# Patient Record
Sex: FEMALE | Race: BLACK OR AFRICAN AMERICAN | NOT HISPANIC OR LATINO | Employment: UNEMPLOYED | URBAN - METROPOLITAN AREA
[De-identification: names, ages, dates, MRNs, and addresses within clinical notes are randomized per-mention and may not be internally consistent; named-entity substitution may affect disease eponyms.]

---

## 2017-02-25 ENCOUNTER — HOSPITAL ENCOUNTER (EMERGENCY)
Facility: CLINIC | Age: 52
Discharge: HOME | End: 2017-02-25
Attending: EMERGENCY MEDICINE | Admitting: EMERGENCY MEDICINE
Payer: COMMERCIAL

## 2017-02-25 ENCOUNTER — APPOINTMENT (OUTPATIENT)
Dept: GENERAL RADIOLOGY | Facility: CLINIC | Age: 52
End: 2017-02-25
Attending: EMERGENCY MEDICINE
Payer: COMMERCIAL

## 2017-02-25 VITALS
HEART RATE: 94 BPM | DIASTOLIC BLOOD PRESSURE: 85 MMHG | WEIGHT: 150.7 LBS | SYSTOLIC BLOOD PRESSURE: 139 MMHG | BODY MASS INDEX: 26.7 KG/M2 | TEMPERATURE: 98 F | OXYGEN SATURATION: 95 % | RESPIRATION RATE: 12 BRPM

## 2017-02-25 DIAGNOSIS — J10.1 INFLUENZA B: ICD-10-CM

## 2017-02-25 LAB
ALBUMIN SERPL-MCNC: 3.5 G/DL (ref 3.4–5)
ALP SERPL-CCNC: 121 U/L (ref 40–150)
ALT SERPL W P-5'-P-CCNC: 37 U/L (ref 0–50)
ANION GAP SERPL CALCULATED.3IONS-SCNC: 9 MMOL/L (ref 3–14)
AST SERPL W P-5'-P-CCNC: 50 U/L (ref 0–45)
BASOPHILS # BLD AUTO: 0 10E9/L (ref 0–0.2)
BASOPHILS NFR BLD AUTO: 0.5 %
BILIRUB SERPL-MCNC: 0.2 MG/DL (ref 0.2–1.3)
BUN SERPL-MCNC: 7 MG/DL (ref 7–30)
CALCIUM SERPL-MCNC: 8.2 MG/DL (ref 8.5–10.1)
CHLORIDE SERPL-SCNC: 110 MMOL/L (ref 94–109)
CO2 SERPL-SCNC: 24 MMOL/L (ref 20–32)
CREAT SERPL-MCNC: 0.6 MG/DL (ref 0.52–1.04)
DIFFERENTIAL METHOD BLD: ABNORMAL
EOSINOPHIL # BLD AUTO: 0.2 10E9/L (ref 0–0.7)
EOSINOPHIL NFR BLD AUTO: 4.1 %
ERYTHROCYTE [DISTWIDTH] IN BLOOD BY AUTOMATED COUNT: 13.1 % (ref 10–15)
FLUAV+FLUBV AG SPEC QL: ABNORMAL
FLUAV+FLUBV AG SPEC QL: NEGATIVE
GFR SERPL CREATININE-BSD FRML MDRD: ABNORMAL ML/MIN/1.7M2
GLUCOSE SERPL-MCNC: 114 MG/DL (ref 70–99)
HCT VFR BLD AUTO: 37.7 % (ref 35–47)
HGB BLD-MCNC: 12.2 G/DL (ref 11.7–15.7)
IMM GRANULOCYTES # BLD: 0 10E9/L (ref 0–0.4)
IMM GRANULOCYTES NFR BLD: 0.3 %
LYMPHOCYTES # BLD AUTO: 1 10E9/L (ref 0.8–5.3)
LYMPHOCYTES NFR BLD AUTO: 25.2 %
MCH RBC QN AUTO: 30.3 PG (ref 26.5–33)
MCHC RBC AUTO-ENTMCNC: 32.4 G/DL (ref 31.5–36.5)
MCV RBC AUTO: 94 FL (ref 78–100)
MONOCYTES # BLD AUTO: 0.4 10E9/L (ref 0–1.3)
MONOCYTES NFR BLD AUTO: 9.7 %
NEUTROPHILS # BLD AUTO: 2.4 10E9/L (ref 1.6–8.3)
NEUTROPHILS NFR BLD AUTO: 60.2 %
NRBC # BLD AUTO: 0 10*3/UL
NRBC BLD AUTO-RTO: 0 /100
NT-PROBNP SERPL-MCNC: 55 PG/ML (ref 0–900)
PLATELET # BLD AUTO: 289 10E9/L (ref 150–450)
POTASSIUM SERPL-SCNC: 3.7 MMOL/L (ref 3.4–5.3)
PROT SERPL-MCNC: 7.1 G/DL (ref 6.8–8.8)
RBC # BLD AUTO: 4.02 10E12/L (ref 3.8–5.2)
SODIUM SERPL-SCNC: 143 MMOL/L (ref 133–144)
SPECIMEN SOURCE: ABNORMAL
TROPONIN I SERPL-MCNC: NORMAL UG/L (ref 0–0.04)
WBC # BLD AUTO: 3.9 10E9/L (ref 4–11)

## 2017-02-25 PROCEDURE — 99284 EMERGENCY DEPT VISIT MOD MDM: CPT | Mod: Z6 | Performed by: EMERGENCY MEDICINE

## 2017-02-25 PROCEDURE — 85025 COMPLETE CBC W/AUTO DIFF WBC: CPT | Performed by: EMERGENCY MEDICINE

## 2017-02-25 PROCEDURE — 99284 EMERGENCY DEPT VISIT MOD MDM: CPT | Mod: 25 | Performed by: EMERGENCY MEDICINE

## 2017-02-25 PROCEDURE — 87804 INFLUENZA ASSAY W/OPTIC: CPT | Performed by: EMERGENCY MEDICINE

## 2017-02-25 PROCEDURE — 71020 XR CHEST 2 VW: CPT

## 2017-02-25 PROCEDURE — 80053 COMPREHEN METABOLIC PANEL: CPT | Performed by: EMERGENCY MEDICINE

## 2017-02-25 PROCEDURE — 94640 AIRWAY INHALATION TREATMENT: CPT | Performed by: EMERGENCY MEDICINE

## 2017-02-25 PROCEDURE — 84484 ASSAY OF TROPONIN QUANT: CPT | Performed by: EMERGENCY MEDICINE

## 2017-02-25 PROCEDURE — 25000132 ZZH RX MED GY IP 250 OP 250 PS 637: Performed by: EMERGENCY MEDICINE

## 2017-02-25 PROCEDURE — 83880 ASSAY OF NATRIURETIC PEPTIDE: CPT | Performed by: EMERGENCY MEDICINE

## 2017-02-25 PROCEDURE — 25000125 ZZHC RX 250: Performed by: EMERGENCY MEDICINE

## 2017-02-25 RX ORDER — CODEINE PHOSPHATE AND GUAIFENESIN 10; 100 MG/5ML; MG/5ML
1-2 SOLUTION ORAL EVERY 4 HOURS PRN
Qty: 200 ML | Refills: 0 | Status: SHIPPED | OUTPATIENT
Start: 2017-02-25 | End: 2022-01-07

## 2017-02-25 RX ORDER — CODEINE PHOSPHATE AND GUAIFENESIN 10; 100 MG/5ML; MG/5ML
10 SOLUTION ORAL ONCE
Status: COMPLETED | OUTPATIENT
Start: 2017-02-25 | End: 2017-02-25

## 2017-02-25 RX ORDER — ALBUTEROL SULFATE 0.83 MG/ML
2.5 SOLUTION RESPIRATORY (INHALATION) ONCE
Status: COMPLETED | OUTPATIENT
Start: 2017-02-25 | End: 2017-02-25

## 2017-02-25 RX ADMIN — ALBUTEROL SULFATE 2.5 MG: 2.5 SOLUTION RESPIRATORY (INHALATION) at 17:37

## 2017-02-25 RX ADMIN — GUAIFENESIN AND CODEINE PHOSPHATE 10 ML: 10; 100 LIQUID ORAL at 18:25

## 2017-02-25 ASSESSMENT — ENCOUNTER SYMPTOMS
MYALGIAS: 1
COUGH: 1
ABDOMINAL PAIN: 0

## 2017-02-25 NOTE — ED PROVIDER NOTES
History     Chief Complaint   Patient presents with     Cough     cough for the past 3 days     HPI  Jenny Stephen is a 52 year old female who presents with cough for 3 days.  She denies asthma.  She says she is having trouble sleeping because of the cough.  Both sides of her chest and throat are hurting because she is coughing so much.  She denies hx of diabetes or heart disease.  She has been taking ibuprofen for discomfort.     I have reviewed the Medications, Allergies, Past Medical and Surgical History, and Social History in the Epic system.    Review of Systems   Respiratory: Positive for cough.    Cardiovascular: Positive for chest pain. Negative for leg swelling.   Gastrointestinal: Negative for abdominal pain.   Musculoskeletal: Positive for myalgias.   All other systems reviewed and are negative.      Physical Exam   BP: 145/75  Pulse: 98  Temp: 98  F (36.7  C)  Resp: 18  Weight: 68.4 kg (150 lb 11.2 oz)  SpO2: 97 %  Physical Exam   Constitutional: She is oriented to person, place, and time. She appears well-developed and well-nourished. No distress.   Coughing frequently but not sob.    HENT:   Head: Normocephalic and atraumatic.   Right Ear: External ear normal.   Left Ear: External ear normal.   Nose: Nose normal.   Mouth/Throat: Oropharynx is clear and moist.   Eyes: EOM are normal. Pupils are equal, round, and reactive to light.   Neck: Normal range of motion. Neck supple.   Cardiovascular: Normal rate, regular rhythm and normal heart sounds.    Pulmonary/Chest: Effort normal and breath sounds normal.   Coughing frequently   Abdominal: Soft. Bowel sounds are normal.   Musculoskeletal: Normal range of motion. She exhibits no edema.   Lymphadenopathy:     She has no cervical adenopathy.   Neurological: She is alert and oriented to person, place, and time.   Skin: Skin is warm and dry. No rash noted. She is not diaphoretic.   Psychiatric: She has a normal mood and affect. Her behavior is  normal.   Nursing note and vitals reviewed.      ED Course     ED Course     Procedures           Results for orders placed or performed during the hospital encounter of 02/25/17 (from the past 24 hour(s))   Influenza A/B antigen swab   Result Value Ref Range    Influenza A/B Agn Specimen Nasopharyngeal     Influenza A Negative NEG    Influenza B (A) NEG     Positive   Test results must be correlated with clinical data. If necessary, results   should be confirmed by a molecular assay or viral culture.     CBC with platelets differential   Result Value Ref Range    WBC 3.9 (L) 4.0 - 11.0 10e9/L    RBC Count 4.02 3.8 - 5.2 10e12/L    Hemoglobin 12.2 11.7 - 15.7 g/dL    Hematocrit 37.7 35.0 - 47.0 %    MCV 94 78 - 100 fl    MCH 30.3 26.5 - 33.0 pg    MCHC 32.4 31.5 - 36.5 g/dL    RDW 13.1 10.0 - 15.0 %    Platelet Count 289 150 - 450 10e9/L    Diff Method Automated Method     % Neutrophils 60.2 %    % Lymphocytes 25.2 %    % Monocytes 9.7 %    % Eosinophils 4.1 %    % Basophils 0.5 %    % Immature Granulocytes 0.3 %    Nucleated RBCs 0 0 /100    Absolute Neutrophil 2.4 1.6 - 8.3 10e9/L    Absolute Lymphocytes 1.0 0.8 - 5.3 10e9/L    Absolute Monocytes 0.4 0.0 - 1.3 10e9/L    Absolute Eosinophils 0.2 0.0 - 0.7 10e9/L    Absolute Basophils 0.0 0.0 - 0.2 10e9/L    Abs Immature Granulocytes 0.0 0 - 0.4 10e9/L    Absolute Nucleated RBC 0.0    Comprehensive metabolic panel   Result Value Ref Range    Sodium 143 133 - 144 mmol/L    Potassium 3.7 3.4 - 5.3 mmol/L    Chloride 110 (H) 94 - 109 mmol/L    Carbon Dioxide 24 20 - 32 mmol/L    Anion Gap 9 3 - 14 mmol/L    Glucose 114 (H) 70 - 99 mg/dL    Urea Nitrogen 7 7 - 30 mg/dL    Creatinine 0.60 0.52 - 1.04 mg/dL    GFR Estimate >90  Non  GFR Calc   >60 mL/min/1.7m2    GFR Estimate If Black >90   GFR Calc   >60 mL/min/1.7m2    Calcium 8.2 (L) 8.5 - 10.1 mg/dL    Bilirubin Total 0.2 0.2 - 1.3 mg/dL    Albumin 3.5 3.4 - 5.0 g/dL    Protein Total  7.1 6.8 - 8.8 g/dL    Alkaline Phosphatase 121 40 - 150 U/L    ALT 37 0 - 50 U/L    AST 50 (H) 0 - 45 U/L   Troponin I   Result Value Ref Range    Troponin I ES  0.000 - 0.045 ug/L     <0.015  The 99th percentile for upper reference range is 0.045 ug/L.  Troponin values in   the range of 0.045 - 0.120 ug/L may be associated with risks of adverse   clinical events.     Nt probnp inpatient (BNP)   Result Value Ref Range    N-Terminal Pro BNP Inpatient 55 0 - 900 pg/mL   XR Chest 2 Views    Narrative    XR CHEST 2 VW 2/25/2017 6:48 PM    COMPARISON: 7/10/2012    HISTORY: Cough      Impression    IMPRESSION: Cardiac silhouette and pulmonary vasculature are within  normal limits. No focal airspace disease, pleural effusion or  pneumothorax.    SAHIL BRONSON           Assessments & Plan (with Medical Decision Making)   The patient presents with coughing a lot for the past 3 days.  She denies hx of diabetes or heart disease.  Labs and cxr were ordered and reviewed.  The influenza B test is positive.  BNP and troponin are normal.  CXR is negative for infiltrate or pneumothorax.  Cardiac silhouette is not enlarged.  She was given an albuterol neb and lung sounds do not change.  She was given robitussin AC and she feels better.  She should continue with ibuprofen.  I will prescribe robitussin AC.  She has been sick for 3 days so I will not prescribe tamiflu.  She does not appear sob, is not hypoxic or febrile here.  She was d/c home.   I have reviewed the nursing notes.    I have reviewed the findings, diagnosis, plan and need for follow up with the patient.    New Prescriptions    GUAIFENESIN-CODEINE (ROBITUSSIN AC) 100-10 MG/5ML SOLN SOLUTION    Take 5-10 mLs by mouth every 4 hours as needed for cough       Final diagnoses:   Influenza B       2/25/2017   [facility], [place], EMERGENCY DEPARTMENT     Odilia Lawrence MD  02/25/17 8618

## 2017-02-25 NOTE — ED AVS SNAPSHOT
[facility], Emergency Department    [address]    [facility] [address]    Phone:  [phone]    Fax:  [phone]                                       Jenny Stephen   MRN: [id number]    Department:  [facility], Emergency Department   Date of Visit:  2/25/2017           Patient Information     Date Of Birth          1965        Your diagnoses for this visit were:     Influenza B        You were seen by Odilia Lawrence MD.        Discharge Instructions       Continue to use ibuprofen for body aches and fever.     You can use robitussin AC for cough.  It will also help with pain and will help you sleep at night.         Influenza  Influenza ( the flu ) is an infection that affects your respiratory tract (the mouth, nose, and lungs, and the passages between them). Unlike a cold, the flu can make you very ill. And it can lead to pneumonia, a serious lung infection. For some people, especially older adults, young children, and people with certain chronic conditions, the flu can have serious complications and even be fatal.  What Are the Risk Factors for the Flu?     Viruses that cause influenza spread through the air in droplets when someone who has the flu coughs, sneezes, laughs, or talks.   Anyone can get the flu. But you re more likely to become infected if you:    Have a weakened immune system.    Work in a health care setting where you may be exposed to flu germs.    Live or work with someone who has the flu.    Haven t received an annual flu shot.  How Does the Flu Spread?  The flu is caused by viruses. The viruses spread through the air in droplets when someone who has the flu coughs, sneezes, laughs, or talks. You can become infected when you inhale these viruses directly. You can also become infected when you touch a surface on which the droplets have landed and then transfer the germs to your eyes, nose, or mouth. Touching used tissues, or sharing utensils, drinking glasses, or a  toothbrush with an infected person can expose you to flu viruses, too.  What Are the Symptoms of the Flu?  Flu symptoms tend to come on quickly and may last a few days to a few weeks. They include:    Fever usually higher than 101 F  (38.3 C) and chills    Sore throat and headache    Dry cough    Runny nose    Tiredness and weakness    Muscle aches  Factors That Can Make Flu Worse  For some people, the flu can be very serious. The risk of complications is greater for:    Children under age 5.    Adults 65 years of age and older.    People with a chronic illness, such as diabetes or heart, kidney, or lung disease.    People who live in a nursing home or long-term care facility.   How Is the Flu Treated?  Influenza usually improves after 7 days or so. In some cases, your health care provider may prescribe an antiviral medication. This may help you get well sooner. For the medication to help, you need to take it as soon as possible (ideally within 48 hours) after your symptoms start. If you develop pneumonia or other serious illness, hospital care may be needed.  Easing Flu Symptoms    Drink lots of fluids such as water, juice, and warm soup. A good rule is to drink enough so that you urinate your normal amount.    Get plenty of rest.    Ask your health care provider what to take for fever and pain.    Call your provider if your fever rises over 101 F (38.3 C) or you become dizzy, lightheaded, or short of breath.  Taking Steps to Protect Others    Wash your hands often, especially after coughing or sneezing. Or, clean your hands with an alcohol-based hand  containing at least 60 percent alcohol.    Cough or sneeze into a tissue. Then throw the tissue away and wash your hands. If you don t have a tissue, cough and sneeze into the crook of your elbow.    Stay home until at least 24 hours after you no longer have a fever or chills. Be sure the fever isn t being hidden by fever-reducing medication.    Don t share  food, utensils, drinking glasses, or a toothbrush with others.    Ask your health care provider if others in your household should receive antiviral medication to help them avoid infection.  How Can the Flu Be Prevented?    One of the best ways to avoid the flu is to get a flu vaccination each year. Viruses that cause the flu change from year to year. For that reason, doctors recommend getting the flu vaccine each year, as soon as it's available in your area. The vaccine may be given as a shot or as a nasal spray. Your health care provider can tell you which vaccine is right for you.    Wash your hands often. Frequent handwashing is a proven way to help prevent infection.    Carry an alcohol-based hand gel containing at least 60 percent alcohol. Use it when you don t have access to soap and water. Then wash your hands as soon as you can.    Avoid touching your eyes, nose, and mouth.    At home and work, clean phones, computer keyboards, and toys often with disinfectant wipes.    If possible, avoid close contact with others who have the flu or symptoms of the flu.  Handwashing Tips  Handwashing is one of the best ways to prevent many common infections. If you re caring for or visiting someone with the flu, wash your hands each time you enter and leave the room. Follow these steps:    Use warm water and plenty of soap. Rub your hands together well.    Clean the whole hand, under your nails, between your fingers, and up the wrists.    Wash for at least 15 seconds.    Rinse, letting the water run down your fingers, not up your wrists.    Dry your hands well. Use a paper towel to turn off the faucet and open the door.  Using Alcohol-Based Hand   Alcohol-based hand  are also a good choice. Use them when you don t have access to soap and water. Follow these steps:    Squeeze about a tablespoon of gel into the palm of one hand.    Rub your hands together briskly, cleaning the backs of your hands, the palms,  between your fingers, and up the wrists.    Rub until the gel is gone and your hands are completely dry.  Preventing Influenza in Healthcare Settings  The flu is a special concern for people in hospitals and long-term care facilities. To help prevent the spread of flu, many hospitals and nursing homes take these steps:    Health care providers wash their hands or use an alcohol-based hand  before and after treating each patient.    People with the flu have private rooms and bathrooms or share a room with someone with the same infection.    High-risk patients who don t have the flu are encouraged to get the flu and pneumonia vaccines.    All health care workers are encouraged or required to get flu shots.        5886-4432 [employer]. [address] [place] [address], [place] [address]. All rights reserved. This information is not intended as a substitute for professional medical care. Always follow your healthcare professional's instructions.          24 Hour Appointment Hotline       To make an appointment at any [facility], call [phone] ([phone]). If you don't have a family doctor or clinic, we will help you find one. [facility] clinics are conveniently located to serve the needs of you and your family.             Review of your medicines      START taking        Dose / Directions Last dose taken    guaiFENesin-codeine 100-10 MG/5ML Soln solution   Commonly known as:  ROBITUSSIN AC   Dose:  1-2 tsp.   Quantity:  200 mL        Take 5-10 mLs by mouth every 4 hours as needed for cough   Refills:  0          Our records show that you are taking the medicines listed below. If these are incorrect, please call your family doctor or clinic.        Dose / Directions Last dose taken    APAP 500 MG Tabs   Dose:  1-2 tablet        Take 1-2 tablets by mouth 2 times daily.   Refills:  0        CYMBALTA 30 MG EC capsule   Dose:  1 capsule   Generic drug:  DULoxetine        Take 1 capsule by mouth  "daily.   Refills:  0        HYDROcodone-acetaminophen 5-325 MG per tablet   Commonly known as:  NORCO   Dose:  1-2 tablet   Quantity:  15 tablet        Take 1-2 tablets by mouth every 4 hours as needed for moderate to severe pain   Refills:  0        ibuprofen 600 MG tablet   Commonly known as:  ADVIL/MOTRIN   Dose:  600 mg   Quantity:  30 tablet        Take 1 tablet (600 mg) by mouth every 6 hours as needed for pain   Refills:  0        nystatin 389074 UNIT/GM Powd   Commonly known as:  MYCOSTATIN   Quantity:  30 g        Apply topically 3 times daily as needed   Refills:  1                Prescriptions were sent or printed at these locations (1 Prescription)                   Other Prescriptions                Printed at Department/Unit printer (1 of 1)         guaiFENesin-codeine (ROBITUSSIN AC) 100-10 MG/5ML SOLN solution                Procedures and tests performed during your visit     CBC with platelets differential    Comprehensive metabolic panel    Influenza A/B antigen swab    Nt probnp inpatient (BNP)    Troponin I    XR Chest 2 Views      Orders Needing Specimen Collection     None      Pending Results     No orders found from 2/23/2017 to 2/26/2017.            Pending Culture Results     No orders found from 2/23/2017 to 2/26/2017.            Thank you for choosing [facility]       Thank you for choosing [facility] for your care. Our goal is always to provide you with excellent care. Hearing back from our patients is one way we can continue to improve our services. Please take a few minutes to complete the written survey that you may receive in the mail after you visit with us. Thank you!        [employer] Information     [employer] lets you send messages to your doctor, view your test results, renew your prescriptions, schedule appointments and more. To sign up, go to [email or web address] . Click on \"Log in\" on the left side of the screen, which will take you to the Welcome page. Then click on \"Sign up " "Now\" on the right side of the page.     You will be asked to enter the access code listed below, as well as some personal information. Please follow the directions to create your username and password.     Your access code is: [email or web address]  Expires: 2017  7:14 PM     Your access code will  in 90 days. If you need help or a new code, please call your [facility] clinic or [phone].        Care EveryWhere ID     This is your Care EveryWhere ID. This could be used by other organizations to access your [facility] medical records  [id number]        After Visit Summary       This is your record. Keep this with you and show to your community pharmacist(s) and doctor(s) at your next visit.                  "

## 2017-02-25 NOTE — ED AVS SNAPSHOT
[facility], Emergency Department    [address]    [facility] [place] [address]    Phone:  [phone]    Fax:  [phone]                                       Jenny Stephen   MRN: [id number]    Department:  [facility], Emergency Department   Date of Visit:  2/25/2017           After Visit Summary Signature Page     I have received my discharge instructions, and my questions have been answered. I have discussed any challenges I see with this plan with the nurse or doctor.    ..........................................................................................................................................  Patient/Patient Representative Signature      ..........................................................................................................................................  Patient Representative Print Name and Relationship to Patient    ..................................................               ................................................  Date                                            Time    ..........................................................................................................................................  Reviewed by Signature/Title    ...................................................              ..............................................  Date                                                            Time

## 2017-02-26 NOTE — DISCHARGE INSTRUCTIONS
Continue to use ibuprofen for body aches and fever.     You can use robitussin AC for cough.  It will also help with pain and will help you sleep at night.         Influenza  Influenza ( the flu ) is an infection that affects your respiratory tract (the mouth, nose, and lungs, and the passages between them). Unlike a cold, the flu can make you very ill. And it can lead to pneumonia, a serious lung infection. For some people, especially older adults, young children, and people with certain chronic conditions, the flu can have serious complications and even be fatal.  What Are the Risk Factors for the Flu?     Viruses that cause influenza spread through the air in droplets when someone who has the flu coughs, sneezes, laughs, or talks.   Anyone can get the flu. But you re more likely to become infected if you:    Have a weakened immune system.    Work in a health care setting where you may be exposed to flu germs.    Live or work with someone who has the flu.    Haven t received an annual flu shot.  How Does the Flu Spread?  The flu is caused by viruses. The viruses spread through the air in droplets when someone who has the flu coughs, sneezes, laughs, or talks. You can become infected when you inhale these viruses directly. You can also become infected when you touch a surface on which the droplets have landed and then transfer the germs to your eyes, nose, or mouth. Touching used tissues, or sharing utensils, drinking glasses, or a toothbrush with an infected person can expose you to flu viruses, too.  What Are the Symptoms of the Flu?  Flu symptoms tend to come on quickly and may last a few days to a few weeks. They include:    Fever usually higher than 101 F  (38.3 C) and chills    Sore throat and headache    Dry cough    Runny nose    Tiredness and weakness    Muscle aches  Factors That Can Make Flu Worse  For some people, the flu can be very serious. The risk of complications is greater for:    Children under  age 5.    Adults 65 years of age and older.    People with a chronic illness, such as diabetes or heart, kidney, or lung disease.    People who live in a nursing home or long-term care facility.   How Is the Flu Treated?  Influenza usually improves after 7 days or so. In some cases, your health care provider may prescribe an antiviral medication. This may help you get well sooner. For the medication to help, you need to take it as soon as possible (ideally within 48 hours) after your symptoms start. If you develop pneumonia or other serious illness, hospital care may be needed.  Easing Flu Symptoms    Drink lots of fluids such as water, juice, and warm soup. A good rule is to drink enough so that you urinate your normal amount.    Get plenty of rest.    Ask your health care provider what to take for fever and pain.    Call your provider if your fever rises over 101 F (38.3 C) or you become dizzy, lightheaded, or short of breath.  Taking Steps to Protect Others    Wash your hands often, especially after coughing or sneezing. Or, clean your hands with an alcohol-based hand  containing at least 60 percent alcohol.    Cough or sneeze into a tissue. Then throw the tissue away and wash your hands. If you don t have a tissue, cough and sneeze into the crook of your elbow.    Stay home until at least 24 hours after you no longer have a fever or chills. Be sure the fever isn t being hidden by fever-reducing medication.    Don t share food, utensils, drinking glasses, or a toothbrush with others.    Ask your health care provider if others in your household should receive antiviral medication to help them avoid infection.  How Can the Flu Be Prevented?    One of the best ways to avoid the flu is to get a flu vaccination each year. Viruses that cause the flu change from year to year. For that reason, doctors recommend getting the flu vaccine each year, as soon as it's available in your area. The vaccine may be given as a  shot or as a nasal spray. Your health care provider can tell you which vaccine is right for you.    Wash your hands often. Frequent handwashing is a proven way to help prevent infection.    Carry an alcohol-based hand gel containing at least 60 percent alcohol. Use it when you don t have access to soap and water. Then wash your hands as soon as you can.    Avoid touching your eyes, nose, and mouth.    At home and work, clean phones, computer keyboards, and toys often with disinfectant wipes.    If possible, avoid close contact with others who have the flu or symptoms of the flu.  Handwashing Tips  Handwashing is one of the best ways to prevent many common infections. If you re caring for or visiting someone with the flu, wash your hands each time you enter and leave the room. Follow these steps:    Use warm water and plenty of soap. Rub your hands together well.    Clean the whole hand, under your nails, between your fingers, and up the wrists.    Wash for at least 15 seconds.    Rinse, letting the water run down your fingers, not up your wrists.    Dry your hands well. Use a paper towel to turn off the faucet and open the door.  Using Alcohol-Based Hand   Alcohol-based hand  are also a good choice. Use them when you don t have access to soap and water. Follow these steps:    Squeeze about a tablespoon of gel into the palm of one hand.    Rub your hands together briskly, cleaning the backs of your hands, the palms, between your fingers, and up the wrists.    Rub until the gel is gone and your hands are completely dry.  Preventing Influenza in Healthcare Settings  The flu is a special concern for people in hospitals and long-term care facilities. To help prevent the spread of flu, many hospitals and nursing homes take these steps:    Health care providers wash their hands or use an alcohol-based hand  before and after treating each patient.    People with the flu have private rooms and  bathrooms or share a room with someone with the same infection.    High-risk patients who don t have the flu are encouraged to get the flu and pneumonia vaccines.    All health care workers are encouraged or required to get flu shots.        4331-4092 [employer]. [address] [place] [address]. All rights reserved. This information is not intended as a substitute for professional medical care. Always follow your healthcare professional's instructions.

## 2017-10-25 ENCOUNTER — HOSPITAL ENCOUNTER (EMERGENCY)
Facility: CLINIC | Age: 52
Discharge: HOME | End: 2017-10-25
Attending: EMERGENCY MEDICINE | Admitting: EMERGENCY MEDICINE
Payer: COMMERCIAL

## 2017-10-25 ENCOUNTER — APPOINTMENT (OUTPATIENT)
Dept: GENERAL RADIOLOGY | Facility: CLINIC | Age: 52
End: 2017-10-25
Attending: EMERGENCY MEDICINE
Payer: COMMERCIAL

## 2017-10-25 VITALS
DIASTOLIC BLOOD PRESSURE: 81 MMHG | OXYGEN SATURATION: 100 % | TEMPERATURE: 97.9 F | HEART RATE: 72 BPM | WEIGHT: 152.6 LBS | RESPIRATION RATE: 17 BRPM | HEIGHT: 63 IN | BODY MASS INDEX: 27.04 KG/M2 | SYSTOLIC BLOOD PRESSURE: 148 MMHG

## 2017-10-25 DIAGNOSIS — J18.9 COMMUNITY ACQUIRED PNEUMONIA, UNSPECIFIED LATERALITY: ICD-10-CM

## 2017-10-25 LAB
ANION GAP SERPL CALCULATED.3IONS-SCNC: 6 MMOL/L (ref 3–14)
BASOPHILS # BLD AUTO: 0 10E9/L (ref 0–0.2)
BASOPHILS NFR BLD AUTO: 0.4 %
BUN SERPL-MCNC: 9 MG/DL (ref 7–30)
CALCIUM SERPL-MCNC: 8.9 MG/DL (ref 8.5–10.1)
CHLORIDE SERPL-SCNC: 107 MMOL/L (ref 94–109)
CO2 SERPL-SCNC: 29 MMOL/L (ref 20–32)
CREAT SERPL-MCNC: 0.66 MG/DL (ref 0.52–1.04)
DIFFERENTIAL METHOD BLD: ABNORMAL
EOSINOPHIL # BLD AUTO: 0.2 10E9/L (ref 0–0.7)
EOSINOPHIL NFR BLD AUTO: 4.7 %
ERYTHROCYTE [DISTWIDTH] IN BLOOD BY AUTOMATED COUNT: 13 % (ref 10–15)
GFR SERPL CREATININE-BSD FRML MDRD: >90 ML/MIN/1.7M2
GLUCOSE SERPL-MCNC: 102 MG/DL (ref 70–99)
HCT VFR BLD AUTO: 39.6 % (ref 35–47)
HGB BLD-MCNC: 12.7 G/DL (ref 11.7–15.7)
IMM GRANULOCYTES # BLD: 0 10E9/L (ref 0–0.4)
IMM GRANULOCYTES NFR BLD: 0.2 %
LYMPHOCYTES # BLD AUTO: 2.4 10E9/L (ref 0.8–5.3)
LYMPHOCYTES NFR BLD AUTO: 53.9 %
MCH RBC QN AUTO: 30 PG (ref 26.5–33)
MCHC RBC AUTO-ENTMCNC: 32.1 G/DL (ref 31.5–36.5)
MCV RBC AUTO: 94 FL (ref 78–100)
MONOCYTES # BLD AUTO: 0.4 10E9/L (ref 0–1.3)
MONOCYTES NFR BLD AUTO: 9.6 %
NEUTROPHILS # BLD AUTO: 1.4 10E9/L (ref 1.6–8.3)
NEUTROPHILS NFR BLD AUTO: 31.2 %
NRBC # BLD AUTO: 0 10*3/UL
NRBC BLD AUTO-RTO: 0 /100
PLATELET # BLD AUTO: 311 10E9/L (ref 150–450)
POTASSIUM SERPL-SCNC: 4 MMOL/L (ref 3.4–5.3)
RBC # BLD AUTO: 4.23 10E12/L (ref 3.8–5.2)
SODIUM SERPL-SCNC: 142 MMOL/L (ref 133–144)
TROPONIN I SERPL-MCNC: <0.015 UG/L (ref 0–0.04)
WBC # BLD AUTO: 4.5 10E9/L (ref 4–11)

## 2017-10-25 PROCEDURE — 93010 ELECTROCARDIOGRAM REPORT: CPT | Mod: Z6 | Performed by: EMERGENCY MEDICINE

## 2017-10-25 PROCEDURE — 99285 EMERGENCY DEPT VISIT HI MDM: CPT | Mod: 25 | Performed by: EMERGENCY MEDICINE

## 2017-10-25 PROCEDURE — 84484 ASSAY OF TROPONIN QUANT: CPT | Performed by: EMERGENCY MEDICINE

## 2017-10-25 PROCEDURE — 99284 EMERGENCY DEPT VISIT MOD MDM: CPT | Mod: 25 | Performed by: EMERGENCY MEDICINE

## 2017-10-25 PROCEDURE — 93005 ELECTROCARDIOGRAM TRACING: CPT | Performed by: EMERGENCY MEDICINE

## 2017-10-25 PROCEDURE — 85025 COMPLETE CBC W/AUTO DIFF WBC: CPT | Performed by: EMERGENCY MEDICINE

## 2017-10-25 PROCEDURE — 96360 HYDRATION IV INFUSION INIT: CPT | Performed by: EMERGENCY MEDICINE

## 2017-10-25 PROCEDURE — 80048 BASIC METABOLIC PNL TOTAL CA: CPT | Performed by: EMERGENCY MEDICINE

## 2017-10-25 PROCEDURE — 71020 XR CHEST 2 VW: CPT

## 2017-10-25 PROCEDURE — 25000128 H RX IP 250 OP 636: Performed by: EMERGENCY MEDICINE

## 2017-10-25 RX ORDER — AZITHROMYCIN 250 MG/1
TABLET, FILM COATED ORAL
Qty: 6 TABLET | Refills: 0 | Status: SHIPPED | OUTPATIENT
Start: 2017-10-25 | End: 2017-10-30

## 2017-10-25 RX ADMIN — SODIUM CHLORIDE 1000 ML: 9 INJECTION, SOLUTION INTRAVENOUS at 22:25

## 2017-10-25 ASSESSMENT — ENCOUNTER SYMPTOMS
FEVER: 0
COUGH: 1
SHORTNESS OF BREATH: 1

## 2017-10-25 NOTE — ED AVS SNAPSHOT
[facility], Emergency Department    [address]    [place] [address]    Phone:  [phone]    Fax:  [phone]                                       Jenny Stephen   MRN: [id number]    Department:  [facility], Emergency Department   Date of Visit:  10/25/2017           After Visit Summary Signature Page     I have received my discharge instructions, and my questions have been answered. I have discussed any challenges I see with this plan with the nurse or doctor.    ..........................................................................................................................................  Patient/Patient Representative Signature      ..........................................................................................................................................  Patient Representative Print Name and Relationship to Patient    ..................................................               ................................................  Date                                            Time    ..........................................................................................................................................  Reviewed by Signature/Title    ...................................................              ..............................................  Date                                                            Time

## 2017-10-25 NOTE — ED AVS SNAPSHOT
[facility], Emergency Department    [address]    [facility] [place] [address]    Phone:  [phone]    Fax:  [phone]                                       Jenny Stephen   MRN: [id number]    Department:  [facility], Emergency Department   Date of Visit:  10/25/2017           Patient Information     Date Of Birth          1965        Your diagnoses for this visit were:     Community acquired pneumonia, unspecified laterality        You were seen by Odilia Lawrence MD.        Discharge Instructions       Take zpak as prescribed.      Follow up with your primary care doctor in 1 week for recheck unless completely improved.     Stay well hydarted.         24 Hour Appointment Hotline       To make an appointment at any [facility] clinic, call [phone] ([phone]). If you don't have a family doctor or clinic, we will help you find one. [facility] clinics are conveniently located to serve the needs of you and your family.             Review of your medicines      START taking        Dose / Directions Last dose taken    azithromycin 250 MG tablet   Commonly known as:  ZITHROMAX Z-DACIA   Quantity:  6 tablet        Two tablets on the first day, then one tablet daily for the next 4 days   Refills:  0          Our records show that you are taking the medicines listed below. If these are incorrect, please call your family doctor or clinic.        Dose / Directions Last dose taken    APAP 500 MG Tabs   Dose:  1-2 tablet        Take 1-2 tablets by mouth 2 times daily.   Refills:  0        CYMBALTA 30 MG EC capsule   Dose:  1 capsule   Generic drug:  DULoxetine        Take 1 capsule by mouth daily.   Refills:  0        guaiFENesin-codeine 100-10 MG/5ML Soln solution   Commonly known as:  ROBITUSSIN AC   Dose:  1-2 tsp.   Quantity:  200 mL        Take 5-10 mLs by mouth every 4 hours as needed for cough   Refills:  0        HYDROcodone-acetaminophen 5-325 MG per tablet   Commonly known as:  NORCO   Dose:   "1-2 tablet   Quantity:  15 tablet        Take 1-2 tablets by mouth every 4 hours as needed for moderate to severe pain   Refills:  0        ibuprofen 600 MG tablet   Commonly known as:  ADVIL/MOTRIN   Dose:  600 mg   Quantity:  30 tablet        Take 1 tablet (600 mg) by mouth every 6 hours as needed for pain   Refills:  0                Prescriptions were sent or printed at these locations (1 Prescription)                   Other Prescriptions                Printed at Department/Unit printer (1 of 1)         azithromycin (ZITHROMAX Z-DACIA) 250 MG tablet                Procedures and tests performed during your visit     Basic metabolic panel    CBC with platelets differential    EKG 12 lead    Troponin I    XR Chest 2 Views      Orders Needing Specimen Collection     None      Pending Results     No orders found from 10/23/2017 to 10/26/2017.            Pending Culture Results     No orders found from 10/23/2017 to 10/26/2017.            Pending Results Instructions     If you had any lab results that were not finalized at the time of your Discharge, you can call the ED Lab Result RN at [phone]. You will be contacted by this team for any positive Lab results or changes in treatment. The nurses are available 7 days a week from 10A to 6:30P.  You can leave a message 24 hours per day and they will return your call.        Thank you for choosing [facility]       Thank you for choosing [facility] for your care. Our goal is always to provide you with excellent care. Hearing back from our patients is one way we can continue to improve our services. Please take a few minutes to complete the written survey that you may receive in the mail after you visit with us. Thank you!        [facility] Information     [employer] lets you send messages to your doctor, view your test results, renew your prescriptions, schedule appointments and more. To sign up, go to [email or web address] . Click on \"Log in\" on the left side of the screen, " "which will take you to the Welcome page. Then click on \"Sign up Now\" on the right side of the page.     You will be asked to enter the access code listed below, as well as some personal information. Please follow the directions to create your username and password.     Your access code is: [email or web address]  Expires: 2018 11:35 PM     Your access code will  in 90 days. If you need help or a new code, please call your [facility] clinic or [phone].        Care EveryWhere ID     This is your Care EveryWhere ID. This could be used by other organizations to access your [facility] medical records  [id number]        Equal Access to Services     THOMAS ADAMS : Roxane Worrell, erich auguste, merry funez, sydnee guerra. So [facility] [phone].    ATENCIÓN: Si habla español, tiene a oro disposición servicios gratuitos de asistencia lingüística. Llame al [phone].    We comply with applicable federal civil rights laws and [place] laws. We do not discriminate on the basis of race, color, national origin, age, disability, sex, sexual orientation, or gender identity.            After Visit Summary       This is your record. Keep this with you and show to your community pharmacist(s) and doctor(s) at your next visit.                  "

## 2017-10-26 LAB — INTERPRETATION ECG - MUSE: NORMAL

## 2017-10-26 NOTE — ED PROVIDER NOTES
[facility] EMERGENCY DEPARTMENT ([facility])    10/25/17       History     Chief Complaint   Patient presents with     Respiratory Distress     Patient states she has difficulity taking a deep breath in and when she does, she has chest pain that radiates around chest. Symptoms for the last 3 days.      The history is provided by the patient and a relative. The history is limited by a language barrier. A  was used (Wallisian).     Jenny Stephen is a 52 year old female with a medical history of lobar pneumonia, latent tuberculosis and seasonal allergies who presents to the Emergency Department for evaluation of chest pain and difficulty taking a deep breath due to pain. Patient reports the chest pain started 4 days ago, and has been intermittent since. She reports the chest pain is bilateral and moves around her chest. She notes difficulty taking a deep breath due to pain. Patient notes taking a hot shower and eating something hot improves the pain; however, going outside and taking a breath in the cold air exacerbates the pain. Patient notes having similar symptoms 3-4 years ago, in which she was admitted to the hospital. She reports at that time, she was diagnosed with pneumonia and was treated with medications. Patient today denies any fevers, but notes having a cough. She reports taking ibuprofen with minimal relief of the pain. She denies being pregnant and denies any history of PE or DVT.    I have reviewed the Medications, Allergies, Past Medical and Surgical History, and Social History in the [employer] system.    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 No pertinent surgical history.    No family history on file.    Social History   Substance Use Topics     Smoking  "status: Never Smoker     Smokeless tobacco: Never Used     Alcohol use No       No current facility-administered medications for this encounter.      Current Outpatient Prescriptions   Medication     azithromycin (ZITHROMAX Z-DACIA) 250 MG tablet     guaiFENesin-codeine (ROBITUSSIN AC) 100-10 MG/5ML SOLN solution     ibuprofen (ADVIL,MOTRIN) 600 MG tablet     HYDROcodone-acetaminophen (NORCO) 5-325 MG per tablet     Acetaminophen (APAP) 500 MG TABS     DULoxetine (CYMBALTA) 30 MG capsule        Allergies   Allergen Reactions     Chicken Allergy      Date Seed Extract [Zizyphus Jujuba] Swelling     Allergy to Date fruit. Swelling in eyes.      Eggs [Chicken-Derived Products (Egg)] Rash         Review of Systems   Constitutional: Negative for fever.   Respiratory: Positive for cough and shortness of breath (due to pain).    Cardiovascular: Positive for chest pain (intermittent).   All other systems reviewed and are negative.      Physical Exam   BP: 143/77  Pulse: 72  Heart Rate: 71  Temp: 97.8  F (36.6  C)  Resp: 16  Height: 160 cm (5' 3\")  Weight: 69.2 kg (152 lb 9.6 oz)  SpO2: 99 %      Physical Exam   Constitutional: She is oriented to person, place, and time. She appears well-developed and well-nourished. No distress.   HENT:   Head: Normocephalic and atraumatic.   Right Ear: External ear normal.   Left Ear: External ear normal.   Nose: Nose normal.   Mouth/Throat: Oropharynx is clear and moist.   Eyes: EOM are normal. Pupils are equal, round, and reactive to light.   Neck: Normal range of motion. Neck supple.   Cardiovascular: Normal rate, regular rhythm and normal heart sounds.    Pulmonary/Chest: Effort normal and breath sounds normal. She exhibits tenderness.   Dry cough   Abdominal: Soft. Bowel sounds are normal. She exhibits no distension and no mass. There is no tenderness. There is no rebound (over sternum but also back muscles bilaterally) and no guarding.   Musculoskeletal: Normal range of motion. She " exhibits no edema or tenderness.   Neurological: She is alert and oriented to person, place, and time.   Skin: Skin is warm and dry. She is not diaphoretic.   Psychiatric: She has a normal mood and affect.   Nursing note and vitals reviewed.      ED Course   8:16 PM  The patient was seen and examined by Odilia Lawrence MD in Room ED03.     ED Course     Procedures             EKG Interpretation:      Interpreted by Odilia Lawrence  Time reviewed: 2205  Symptoms at time of EKG: None   Rhythm: normal sinus   Rate: Normal  Axis: Normal  Ectopy: none  Conduction: normal  ST Segments/ T Waves: No acute ischemic changes  Q Waves: none  Comparison to prior: Unchanged from 10/21/11    Clinical Impression: no acute changes                   Labs Ordered and Resulted from Time of ED Arrival Up to the Time of Departure from the ED   CBC WITH PLATELETS DIFFERENTIAL - Abnormal; Notable for the following:        Result Value    Absolute Neutrophil 1.4 (*)     All other components within normal limits   BASIC METABOLIC PANEL - Abnormal; Notable for the following:     Glucose 102 (*)     All other components within normal limits   TROPONIN I        Results for orders placed or performed during the hospital encounter of 10/25/17 (from the past 24 hour(s))   CBC with platelets differential   Result Value Ref Range    WBC 4.5 4.0 - 11.0 10e9/L    RBC Count 4.23 3.8 - 5.2 10e12/L    Hemoglobin 12.7 11.7 - 15.7 g/dL    Hematocrit 39.6 35.0 - 47.0 %    MCV 94 78 - 100 fl    MCH 30.0 26.5 - 33.0 pg    MCHC 32.1 31.5 - 36.5 g/dL    RDW 13.0 10.0 - 15.0 %    Platelet Count 311 150 - 450 10e9/L    Diff Method Automated Method     % Neutrophils 31.2 %    % Lymphocytes 53.9 %    % Monocytes 9.6 %    % Eosinophils 4.7 %    % Basophils 0.4 %    % Immature Granulocytes 0.2 %    Nucleated RBCs 0 0 /100    Absolute Neutrophil 1.4 (L) 1.6 - 8.3 10e9/L    Absolute Lymphocytes 2.4 0.8 - 5.3 10e9/L    Absolute Monocytes 0.4 0.0 - 1.3 10e9/L    Absolute  Eosinophils 0.2 0.0 - 0.7 10e9/L    Absolute Basophils 0.0 0.0 - 0.2 10e9/L    Abs Immature Granulocytes 0.0 0 - 0.4 10e9/L    Absolute Nucleated RBC 0.0    Basic metabolic panel   Result Value Ref Range    Sodium 142 133 - 144 mmol/L    Potassium 4.0 3.4 - 5.3 mmol/L    Chloride 107 94 - 109 mmol/L    Carbon Dioxide 29 20 - 32 mmol/L    Anion Gap 6 3 - 14 mmol/L    Glucose 102 (H) 70 - 99 mg/dL    Urea Nitrogen 9 7 - 30 mg/dL    Creatinine 0.66 0.52 - 1.04 mg/dL    GFR Estimate >90 >60 mL/min/1.7m2    GFR Estimate If Black >90 >60 mL/min/1.7m2    Calcium 8.9 8.5 - 10.1 mg/dL   Troponin I   Result Value Ref Range    Troponin I ES <0.015 0.000 - 0.045 ug/L   XR Chest 2 Views    Narrative    CHEST TWO VIEW   10/25/2017 9:18 PM     HISTORY: Pain.    COMPARISON: 2/25/17    FINDINGS: Minimal patchy opacity left base posteriorly new since the  prior study. Right lung clear.      Impression    IMPRESSION: Minimal patchy opacity left base.    KENDALL VAZQUEZ MD     Results for orders placed or performed during the hospital encounter of 10/25/17 (from the past 24 hour(s))   CBC with platelets differential   Result Value Ref Range    WBC 4.5 4.0 - 11.0 10e9/L    RBC Count 4.23 3.8 - 5.2 10e12/L    Hemoglobin 12.7 11.7 - 15.7 g/dL    Hematocrit 39.6 35.0 - 47.0 %    MCV 94 78 - 100 fl    MCH 30.0 26.5 - 33.0 pg    MCHC 32.1 31.5 - 36.5 g/dL    RDW 13.0 10.0 - 15.0 %    Platelet Count 311 150 - 450 10e9/L    Diff Method Automated Method     % Neutrophils 31.2 %    % Lymphocytes 53.9 %    % Monocytes 9.6 %    % Eosinophils 4.7 %    % Basophils 0.4 %    % Immature Granulocytes 0.2 %    Nucleated RBCs 0 0 /100    Absolute Neutrophil 1.4 (L) 1.6 - 8.3 10e9/L    Absolute Lymphocytes 2.4 0.8 - 5.3 10e9/L    Absolute Monocytes 0.4 0.0 - 1.3 10e9/L    Absolute Eosinophils 0.2 0.0 - 0.7 10e9/L    Absolute Basophils 0.0 0.0 - 0.2 10e9/L    Abs Immature Granulocytes 0.0 0 - 0.4 10e9/L    Absolute Nucleated RBC 0.0    Basic metabolic  panel   Result Value Ref Range    Sodium 142 133 - 144 mmol/L    Potassium 4.0 3.4 - 5.3 mmol/L    Chloride 107 94 - 109 mmol/L    Carbon Dioxide 29 20 - 32 mmol/L    Anion Gap 6 3 - 14 mmol/L    Glucose 102 (H) 70 - 99 mg/dL    Urea Nitrogen 9 7 - 30 mg/dL    Creatinine 0.66 0.52 - 1.04 mg/dL    GFR Estimate >90 >60 mL/min/1.7m2    GFR Estimate If Black >90 >60 mL/min/1.7m2    Calcium 8.9 8.5 - 10.1 mg/dL   Troponin I   Result Value Ref Range    Troponin I ES <0.015 0.000 - 0.045 ug/L   XR Chest 2 Views    Narrative    CHEST TWO VIEW   10/25/2017 9:18 PM     HISTORY: Pain.    COMPARISON: 2/25/17    FINDINGS: Minimal patchy opacity left base posteriorly new since the  prior study. Right lung clear.      Impression    IMPRESSION: Minimal patchy opacity left base.    KENDALL VAZQUEZ MD                Assessments & Plan (with Medical Decision Making)   The patient presents with cough and some chest pain over the past 4 days.  She denies recent cold symptoms.  No f/c.  She appears well.  No sob, but occasional dry cough.  Labs were ordered and reviewed.  Troponin is normal.  WBC is also normal.  ECG is unchanged from prior.  Lung exam sounded clear to me.  However, CXR was done given cough and is suggestive of early pneumonia.  Since CXR shows this minimal patchy opacity left base I will prescribe zpak for d/c home.  Vitals are normal.      I have reviewed the nursing notes.    I have reviewed the findings, diagnosis, plan and need for follow up with the patient.    Discharge Medication List as of 10/25/2017 11:35 PM      START taking these medications    Details   azithromycin (ZITHROMAX Z-DACIA) 250 MG tablet Two tablets on the first day, then one tablet daily for the next 4 days, Disp-6 tablet, R-0, Local Print             Final diagnoses:   Community acquired pneumonia, unspecified laterality     IBaldemar, am serving as a trained medical scribe to document services personally performed by Odilia Lawrence MD,  based on the provider's statements to me.   I, Odilia Lawrence MD, was physically present and have reviewed and verified the accuracy of this note documented by Baldemar Burnett.    10/25/2017   [facility], [place], EMERGENCY DEPARTMENT     Odilia Lawrence MD  10/25/17 [id number]

## 2017-10-26 NOTE — ED NOTES
Patient states she has difficulity taking a deep breath in and when she does, she has chest pain that radiates around chest. Symptoms for the last 3 days.

## 2017-10-26 NOTE — DISCHARGE INSTRUCTIONS
Take zpak as prescribed.      Follow up with your primary care doctor in 1 week for recheck unless completely improved.     Stay well hydarted.

## 2017-10-26 NOTE — ED NOTES
Patient presents with chest pain, back and shoulders pain and cough; patient states pain started 4 days ago and is getting worst; denies shortness of breath; denies nausea and denies headache; continuous cardiac monitor and pulse oxymeter applied; family at bedside.

## 2020-01-29 ENCOUNTER — TELEPHONE (OUTPATIENT)
Dept: OPHTHALMOLOGY | Facility: CLINIC | Age: 55
End: 2020-01-29

## 2020-01-29 ENCOUNTER — TRANSCRIBE ORDERS (OUTPATIENT)
Dept: OTHER | Age: 55
End: 2020-01-29

## 2020-01-29 ENCOUNTER — APPOINTMENT (OUTPATIENT)
Dept: INTERPRETER SERVICES | Facility: CLINIC | Age: 55
End: 2020-01-29
Payer: COMMERCIAL

## 2020-01-29 DIAGNOSIS — H53.9 MONOCULAR VISUAL DISTURBANCE: Primary | ICD-10-CM

## 2020-01-29 NOTE — TELEPHONE ENCOUNTER
Called with  and spoke to pt at 1608    New black spot in left eye-- seems to be in same location in vision times 10 days  No changes in size (not larger nor smaller)  Visual acuity stable    No flashing   No eye pain-- maybe small amount of pain at time  No redness  No swelling-- maybe upper lid swelling a little    Offered appt tomorrow with Dr. Fuentes at [facility] and pt accepts    Pt aware of date/time/location at [facility]  Saleem Wolf RN 4:17 PM 01/29/20                    [facility]    Phone Message    May a detailed message be left on voicemail: yes    Reason for Call: Other: Brie Redding from [facility] referring - requesting visit in 203 days for more formal fundopscopic and visual exam. Pt with 7  day history of left-sided black spot in vision     Action Taken: Message routed to:  [facility] ([facility]): Eye

## 2020-01-30 ENCOUNTER — OFFICE VISIT (OUTPATIENT)
Dept: OPHTHALMOLOGY | Facility: CLINIC | Age: 55
End: 2020-01-30
Attending: STUDENT IN AN ORGANIZED HEALTH CARE EDUCATION/TRAINING PROGRAM
Payer: COMMERCIAL

## 2020-01-30 DIAGNOSIS — H40.003 GLAUCOMA SUSPECT OF BOTH EYES: ICD-10-CM

## 2020-01-30 DIAGNOSIS — H43.813 VITREOUS DETACHMENT OF BOTH EYES: Primary | ICD-10-CM

## 2020-01-30 DIAGNOSIS — H52.4 PRESBYOPIA OF BOTH EYES: ICD-10-CM

## 2020-01-30 RX ORDER — FLUOXETINE 20 MG/5ML
10 SOLUTION ORAL
COMMUNITY
Start: 2020-01-28 | End: 2022-01-07

## 2020-01-30 ASSESSMENT — REFRACTION_MANIFEST
OS_ADD: +2.00
OD_SPHERE: +2.00
OD_AXIS: 075
OD_CYLINDER: +0.50
OS_SPHERE: +2.00
OD_ADD: +2.00
OS_CYLINDER: SPHERE

## 2020-01-30 ASSESSMENT — EXTERNAL EXAM - RIGHT EYE: OD_EXAM: NORMAL

## 2020-01-30 ASSESSMENT — VISUAL ACUITY
OD_SC+: -/+
OS_SC: 20/125
OD_SC: 20/125
OS_PH_SC: 20/60
OD_PH_SC: 20/60
METHOD: SNELLEN - LINEAR

## 2020-01-30 ASSESSMENT — TONOMETRY
IOP_METHOD: ICARE
OS_IOP_MMHG: 21
OD_IOP_MMHG: 19

## 2020-01-30 ASSESSMENT — SLIT LAMP EXAM - LIDS
COMMENTS: NORMAL
COMMENTS: NORMAL

## 2020-01-30 ASSESSMENT — EXTERNAL EXAM - LEFT EYE: OS_EXAM: NORMAL

## 2020-01-30 ASSESSMENT — CONF VISUAL FIELD
OS_NORMAL: 1
OD_NORMAL: 1

## 2020-01-30 NOTE — PROGRESS NOTES
HPI:  Patient complains of black spots in the left eye. The spots started two weeks ago. No flashes. No curtains over the vision. This exam is done with a live translater.       Pertinent Medical History:    Latent tuberculosis    Atypical mycobacterium infection 07/2009    Lobar pneumonia    Seasonal allergies    Candidiasis 02/2014    Ocular History:    None    Eye Medications:    None    Assessment and Plan:  1.   PVD, both eyes. Retina attached.     Educated on signs and symptoms of a retinal detachment to be seen immediately.     Due to new symptoms of floaters - recommends repeat dilation with me in one month. Dilate both eyes.    2.   High Risk Glaucoma suspect due to race and cupping, both eyes.     Large optic nerve but there is advanced cupping. There appears to be notching inferior temporal of the right eye. Recommends initial consult with Marco Fay/Storm.     3.   Cataracts, both eyes.     Visually significant. Patient would like to try glasses first and hold off on surgical intervention. Continue to monitor.     4.   Presbyopia and hyperopia, both eyes.     Recommends lined bi-focal. Polycarbonate.         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       DULoxetine (CYMBALTA) 30 MG capsule Take 1 capsule by mouth daily.       guaiFENesin-codeine (ROBITUSSIN AC) 100-10 MG/5ML SOLN solution Take 5-10 mLs by mouth every 4 hours as needed for cough 200 mL 0     HYDROcodone-acetaminophen (NORCO) 5-325 MG per tablet Take 1-2 tablets by mouth every 4 hours as needed for moderate to severe pain 15 tablet 0     ibuprofen (ADVIL,MOTRIN) 600 MG  tablet Take 1 tablet (600 mg) by mouth every 6 hours as needed for pain 30 tablet 0   Complete documentation of historical and exam elements from today's encounter can be found in the full encounter summary report (not reduplicated in this progress note). I personally obtained the chief complaint(s) and history of present illness.  I confirmed and edited as necessary the review of systems, past medical/surgical history, family history, social history, and examination findings as documented by others; and I examined the patient myself. I personally reviewed the relevant tests, images, and reports as documented above. I formulated and edited as necessary the assessment and plan and discussed the findings and management plan with the patient and family. - Sherice Fuentes OD

## 2020-01-30 NOTE — NURSING NOTE
Chief Complaints and History of Present Illnesses   Patient presents with     Consult For     New black spot in left eye     Chief Complaint(s) and History of Present Illness(es)     Consult For     Laterality: left eye    Onset: 11 days ago    Quality: spots in vision    Severity: moderate    Frequency: constantly    Course: stable    Associated symptoms: eye pain.  Negative for flashes    Treatments tried: no treatments    Pain scale: 1/10    Comments: New black spot in left eye              Comments     New black spot in left eye, for 2 weeks. The spot floats around peripherally. No blurred vision. A little eye pain. Not on any eye drops    Hasn't been able to see TV for last 3 weeks, states she only has glasses for reading. Did not bring glasses.    Hanna Ribera COT 4:19 PM January 30, 2020

## 2020-02-19 ENCOUNTER — DOCUMENTATION ONLY (OUTPATIENT)
Dept: CARE COORDINATION | Facility: CLINIC | Age: 55
End: 2020-02-19

## 2020-02-24 DIAGNOSIS — H40.003 GLAUCOMA SUSPECT OF BOTH EYES: Primary | ICD-10-CM

## 2020-02-25 ENCOUNTER — OFFICE VISIT (OUTPATIENT)
Dept: OPHTHALMOLOGY | Facility: CLINIC | Age: 55
End: 2020-02-25
Attending: OPHTHALMOLOGY
Payer: COMMERCIAL

## 2020-02-25 DIAGNOSIS — H52.4 PRESBYOPIA OF BOTH EYES: ICD-10-CM

## 2020-02-25 DIAGNOSIS — H52.03 HYPEROPIA OF BOTH EYES: ICD-10-CM

## 2020-02-25 DIAGNOSIS — H40.003 GLAUCOMA SUSPECT OF BOTH EYES: Primary | ICD-10-CM

## 2020-02-25 DIAGNOSIS — H25.13 AGE-RELATED NUCLEAR CATARACT OF BOTH EYES: ICD-10-CM

## 2020-02-25 DIAGNOSIS — H43.813 VITREOUS DETACHMENT OF BOTH EYES: ICD-10-CM

## 2020-02-25 PROCEDURE — 92133 CPTRZD OPH DX IMG PST SGM ON: CPT | Mod: ZF | Performed by: OPHTHALMOLOGY

## 2020-02-25 PROCEDURE — G0463 HOSPITAL OUTPT CLINIC VISIT: HCPCS | Mod: ZF

## 2020-02-25 PROCEDURE — 92020 GONIOSCOPY: CPT | Mod: ZF | Performed by: OPHTHALMOLOGY

## 2020-02-25 PROCEDURE — 92083 EXTENDED VISUAL FIELD XM: CPT | Mod: ZF | Performed by: OPHTHALMOLOGY

## 2020-02-25 ASSESSMENT — GONIOSCOPY
OD_NASAL: OPEN TO SS
OS_TEMPORAL: OPEN TO SS
OD_SUPERIOR: OPEN TO SS
OS_SUPERIOR: OPEN TO SS
OD_TEMPORAL: OPEN TO SS
OS_NASAL: OPEN TO SS
OS_INFERIOR: OPEN TO SS

## 2020-02-25 ASSESSMENT — REFRACTION_WEARINGRX
OD_SPHERE: +2.00
OS_CYLINDER: SPHERE
SPECS_TYPE: MR IN PHOROPTER
OD_CYLINDER: +0.50
OD_AXIS: 075
OS_SPHERE: +2.00

## 2020-02-25 ASSESSMENT — VISUAL ACUITY
OS_CC+: -1
METHOD: SNELLEN - LINEAR
OD_CC+: -1
OS_PH_CC+: -1
OD_CC: 20/40
CORRECTION_TYPE: GLASSES
OS_PH_CC: 20/30
OS_CC: 20/40

## 2020-02-25 ASSESSMENT — CUP TO DISC RATIO
OD_RATIO: 0.75
OS_RATIO: 0.75

## 2020-02-25 ASSESSMENT — TONOMETRY
OS_IOP_MMHG: 19
IOP_METHOD: APPLANATION
OD_IOP_MMHG: 19

## 2020-02-25 ASSESSMENT — PACHYMETRY
OD_CT(UM): 510
OS_CT(UM): 514

## 2020-02-25 ASSESSMENT — SLIT LAMP EXAM - LIDS
COMMENTS: NORMAL
COMMENTS: NORMAL

## 2020-02-25 ASSESSMENT — CONF VISUAL FIELD
OD_NORMAL: 1
METHOD: COUNTING FINGERS
OS_NORMAL: 1

## 2020-02-25 ASSESSMENT — EXTERNAL EXAM - RIGHT EYE: OD_EXAM: NORMAL

## 2020-02-25 ASSESSMENT — EXTERNAL EXAM - LEFT EYE: OS_EXAM: NORMAL

## 2020-02-25 NOTE — PROGRESS NOTES
Chief Complaint(s) and History of Present Illness(es)     Glaucoma     Laterality: both eyes    Quality: blurred    Treatment side effects: none    Comments: Referred by Dr. Fuentes for glaucoma eval              Comments     Pt denies any significant vision changes in either eye since last visit.  Complains of occasional pain LE.  Complains of black thing moving around LE.  Denies any pressure, irritation, discharge, and tearing.  Does not use any eye medications at this time.    Gardenia Worrell OT 8:19 AM February 25, 2020               Review of systems for the eyes was negative other than the pertinent positives/negatives listed in the HPI.      Assessment & Plan      Jenny Stephen is a 55 year old female with the following diagnoses:   1. Glaucoma suspect of both eyes    2. Vitreous detachment of both eyes    3. Presbyopia of both eyes    4. Age-related nuclear cataract of both eyes    5. Hyperopia of both eyes       Referred by Dr. Fuentes for concern for glaucoma  Gonioscopy- very steep both eyes with variable dense pigment.  Right eye: diffuse pigment with occasional PAS, open 360. 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 Pseudoexfoliation left eye   Concern for Pseudoexfoliation vs Pigment dispersion glaucoma    RNFL OCT: Large cups in both eyes, nerve fiber layer within normal limits   OVF: Not reliable with large false positives and false negatives. Nasal defect in left eye  Cataract in both eyes- pt not interested in surgery at this time.    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  Discussed disease and need for monitoring to avoid potential vision loss.  Reviewed that vision loss may occur without symptoms and that exams are critical to diagnosis and treatment.      Patient disposition:   Return in about 6 months (around 8/25/2020) for Vision, pressure, dilate, RNFL OCT.    Leelee Cole MD  Ophthalmology Resident, PGY-2       Attending  Physician Attestation:  Complete documentation of historical and exam elements from today's encounter can be found in the full encounter summary report (not reduplicated in this progress note).  I personally obtained the chief complaint(s) and history of present illness.  I confirmed and edited as necessary the review of systems, past medical/surgical history, family history, social history, and examination findings as documented by others; and I examined the patient myself.  I personally reviewed the relevant tests, images, and reports as documented above.  I formulated and edited as necessary the assessment and plan and discussed the findings and management plan with the patient and family. .Attending Physician Image/Tesing Attestation: I personally reviewed the ophthalmic test(s) associated with this encounter, agree with the interpretation(s) as documented by the resident/fellow, and have edited the corresponding report(s) as necessary.   - Angel Fay MD

## 2020-02-25 NOTE — NURSING NOTE
Chief Complaints and History of Present Illnesses   Patient presents with     Glaucoma     Referred by Dr. Fuentes for glaucoma eval     Chief Complaint(s) and History of Present Illness(es)     Glaucoma     Laterality: both eyes    Quality: blurred    Treatment side effects: none    Comments: Referred by Dr. Fuentes for glaucoma eval              Comments     Pt denies any significant vision changes in either eye since last visit.  Complains of occasional pain LE.  Complains of black thing moving around LE.  Denies any pressure, irritation, discharge, and tearing.  Does not use any eye medications at this time.    Gardenia Worrell OT 8:19 AM February 25, 2020

## 2020-02-25 NOTE — LETTER
2/25/2020       RE: Jenny Stephen  [address]  [facility] [address]     Dear Colleague,    Thank you for referring your patient, Jenny Stephen, to the EYE CLINIC at [facility]. Please see a copy of my visit note below.    Chief Complaint(s) and History of Present Illness(es)     Glaucoma     Laterality: both eyes    Quality: blurred    Treatment side effects: none    Comments: Referred by Dr. Fuentes for glaucoma eval              Comments     Pt denies any significant vision changes in either eye since last visit.  Complains of occasional pain LE.  Complains of black thing moving around LE.  Denies any pressure, irritation, discharge, and tearing.  Does not use any eye medications at this time.    Gardenia Worrell OT 8:19 AM February 25, 2020               Review of systems for the eyes was negative other than the pertinent positives/negatives listed in the HPI.      Assessment & Plan      Jenny Stephen is a 55 year old female with the following diagnoses:   1. Glaucoma suspect of both eyes    2. Vitreous detachment of both eyes    3. Presbyopia of both eyes    4. Age-related nuclear cataract of both eyes    5. Hyperopia of both eyes       Referred by Dr. Fuentes for concern for glaucoma  Gonioscopy- very steep both eyes with variable dense pigment.  Right eye: diffuse pigment with occasional PAS, open 360. 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 Pseudoexfoliation left eye   Concern for Pseudoexfoliation vs Pigment dispersion glaucoma    RNFL OCT: Large cups in both eyes, nerve fiber layer within normal limits   OVF: Not reliable with large false positives and false negatives. Nasal defect in left eye  Cataract in both eyes- pt not interested in surgery at this time.    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  Discussed disease and need for monitoring to avoid potential vision loss.  Reviewed  that vision loss may occur without symptoms and that exams are critical to diagnosis and treatment.      Patient disposition:   Return in about 6 months (around 8/25/2020) for Vision, pressure, dilate, RNFL OCT.    Leelee Cole MD  Ophthalmology Resident, PGY-2       Attending Physician Attestation:  Complete documentation of historical and exam elements from today's encounter can be found in the full encounter summary report (not reduplicated in this progress note).  I personally obtained the chief complaint(s) and history of present illness.  I confirmed and edited as necessary the review of systems, past medical/surgical history, family history, social history, and examination findings as documented by others; and I examined the patient myself.  I personally reviewed the relevant tests, images, and reports as documented above.  I formulated and edited as necessary the assessment and plan and discussed the findings and management plan with the patient and family. .Attending Physician Image/Tesing Attestation: I personally reviewed the ophthalmic test(s) associated with this encounter, agree with the interpretation(s) as documented by the resident/fellow, and have edited the corresponding report(s) as necessary.   - Angel Fay MD         Main Ophthalmology Exam     External Exam       Right Left    External Normal Normal          Slit Lamp Exam       Right Left    Lids/Lashes Normal Normal    Conjunctiva/Sclera White and quiet White and quiet    Cornea numular scar near the pupillary margin, endopigment, , Krukenberg's spindle Krukenberg's spindle, endo pigment    Anterior Chamber Deep and quiet Deep and quiet    Iris Round and reactive Round and reactive    Lens 2+ Nuclear sclerosis cataract 2+ Nuclear sclerosis cataract, 1+ Cortical cataract    Vitreous Posterior vitreous detachment, Khan ring Posterior vitreous detachment, Khan ring          Fundus Exam       Right Left    Disc large large    C/D Ratio  0.75 0.75              Base Eye Exam     Visual Acuity (Snellen - Linear)       Right Left    Dist cc 20/40 -1 20/40 -1    Dist ph cc  20/30 -1    Correction:  Glasses          Tonometry (Applanation, 8:27 AM)       Right Left    Pressure 19 19   Held lids RE.  Thinner mires BE           Pachymetry (2/25/2020)       Right Left    Thickness 510 514          Gonioscopy       Right Left    Temporal Open to SS Open to SS    Nasal Open to SS Open to SS    Superior Open to SS Open to SS    Inferior Narrow, pas  Open to SS   Very steep insertion, variable pigment with some very dense areas in both eyes.  Peripheral anterior synechiae superior right eye            Pupils       Pupils Dark Light Shape React APD    Right PERRL 4 3 Round Brisk None    Left PERRL 4 3 Round Brisk None          Visual Fields (Counting fingers)       Left Right     Full Full          Extraocular Movement       Right Left     Full Full          Neuro/Psych     Oriented x3:  Yes    Mood/Affect:  Normal   VA checked with last MR in phoropter             Slit Lamp and Fundus Exam     External Exam       Right Left    External Normal Normal          Slit Lamp Exam       Right Left    Lids/Lashes Normal Normal    Conjunctiva/Sclera White and quiet White and quiet    Cornea numular scar near the pupillary margin, endopigment, , Krukenberg's spindle Krukenberg's spindle, endo pigment    Anterior Chamber Deep and quiet Deep and quiet    Iris Round and reactive Round and reactive    Lens 2+ Nuclear sclerosis cataract 2+ Nuclear sclerosis cataract, 1+ Cortical cataract    Vitreous Posterior vitreous detachment, Khan ring Posterior vitreous detachment, Khan ring          Fundus Exam       Right Left    Disc large large    C/D Ratio 0.75 0.75            Refraction     Wearing Rx       Sphere Cylinder Axis    Right +2.00 +0.50 075    Left +2.00 Sphere     Type:  MR in phoropter                Again, thank you for allowing me to participate in the care of your  patient.      Sincerely,    Angel Fay MD

## 2020-09-10 ENCOUNTER — TELEPHONE (OUTPATIENT)
Dept: OPHTHALMOLOGY | Facility: CLINIC | Age: 55
End: 2020-09-10

## 2020-09-21 ENCOUNTER — TELEPHONE (OUTPATIENT)
Dept: OPHTHALMOLOGY | Facility: CLINIC | Age: 55
End: 2020-09-21

## 2020-09-21 NOTE — TELEPHONE ENCOUNTER
[facility] Call Center    Phone Message    May a detailed message be left on voicemail: yes     Reason for Call: Other: Pt needs Eye glass prescription Faxed to [phone]    Thank you,    Action Taken: Message routed to:  [facility] ([facility]): eye    Travel Screening: Not Applicable

## 2021-03-31 ENCOUNTER — TRANSCRIBE ORDERS (OUTPATIENT)
Dept: OTHER | Age: 56
End: 2021-03-31

## 2021-03-31 DIAGNOSIS — Z91.012 ALLERGY HISTORY, EGGS: Primary | ICD-10-CM

## 2021-04-01 ENCOUNTER — TELEPHONE (OUTPATIENT)
Dept: ALLERGY | Facility: CLINIC | Age: 56
End: 2021-04-01
Payer: COMMERCIAL

## 2021-04-01 NOTE — TELEPHONE ENCOUNTER
[facility] Call Center    Phone Message    May a detailed message be left on voicemail: yes     Reason for Call: Appointment Intake    Referring Provider Name: Referred by Dr. Roni Kaplan at [facility]   Diagnosis and/or Symptoms: for evaluation for allergic reaction/anaphylaxis. Throat tightness with egg, chicken and date ingestion.    Action Taken: Message routed to:  [facility] ([facility]): Allergy    Travel Screening: Not Applicable

## 2021-04-09 NOTE — TELEPHONE ENCOUNTER
FUTURE VISIT INFORMATION      FUTURE VISIT INFORMATION:    Date: 5.12.21    Time: 11:00    [place] [facility]  [place]ERRAL INFORMATION:    Referring provider:  Dr. Roni Kaplan    Referring providers clinic:  [facility]    Reason for visit/diagnosis  Allergies    RECORDS REQUESTED FROM:       Clinic name Comments Records Status Imaging Status   [facility] 3.30.21  Dr. Eusebio LANG na

## 2021-05-12 ENCOUNTER — PRE VISIT (OUTPATIENT)
Dept: ALLERGY | Facility: CLINIC | Age: 56
End: 2021-05-12

## 2021-10-04 ENCOUNTER — OFFICE VISIT (OUTPATIENT)
Dept: FAMILY MEDICINE | Facility: CLINIC | Age: 56
End: 2021-10-04
Payer: COMMERCIAL

## 2021-10-04 VITALS
SYSTOLIC BLOOD PRESSURE: 154 MMHG | DIASTOLIC BLOOD PRESSURE: 80 MMHG | HEART RATE: 84 BPM | RESPIRATION RATE: 16 BRPM | TEMPERATURE: 99.6 F | OXYGEN SATURATION: 98 % | WEIGHT: 147 LBS | BODY MASS INDEX: 26.04 KG/M2

## 2021-10-04 DIAGNOSIS — R14.0 ABDOMINAL BLOATING: Primary | ICD-10-CM

## 2021-10-04 DIAGNOSIS — R03.0 ELEVATED BP WITHOUT DIAGNOSIS OF HYPERTENSION: ICD-10-CM

## 2021-10-04 PROCEDURE — 99204 OFFICE O/P NEW MOD 45 MIN: CPT | Mod: GC | Performed by: STUDENT IN AN ORGANIZED HEALTH CARE EDUCATION/TRAINING PROGRAM

## 2021-10-04 ASSESSMENT — ENCOUNTER SYMPTOMS
POLYDIPSIA: 0
PARESTHESIAS: 0
VOMITING: 0
NAUSEA: 0
BACK PAIN: 1
DIFFICULTY URINATING: 0
CONSTIPATION: 0
ABDOMINAL DISTENTION: 1
HEARTBURN: 1
HEMATOCHEZIA: 0
DYSPHORIC MOOD: 0
UNEXPECTED WEIGHT CHANGE: 0
NUMBNESS: 0
SHORTNESS OF BREATH: 0
DIARRHEA: 0

## 2021-10-04 NOTE — PROGRESS NOTES
Assessment & Plan   Jenny was seen today for flank pain.    Diagnoses and all orders for this visit:    Abdominal bloating  Abdominal bloating x9 months, constant, worse with eating, associated with heartburn.  Denies constipation, urinary symptoms, numbness tingling, diarrhea, vomiting.  Differential includes H. pylori infection, gastritis, reflux.  Given lack of constipation or urinary symptoms or vaginal symptoms,  and constipation lower on differential.  No night sweats, fever, unexpected weight changes, or ascites/swelling making malignancy lower on the differential.  No numbness or tingling and hemoglobin A1c of 6.7 March 2021, lowering concern for gastroparesis. Plan to collect H. pylori stool sample.  Patient says she will drop off tomorrow.  Prescribed omeprazole 20 for symptom management.  Emphasized to be started after collection of stool sample.  Patient expressed understanding.  Plan follow-up in 1 month for abdominal pain.  H. pylori test is positive we will treat indicated therapies.     - Helicobacter pylori Antigen Stool  - omeprazole (PRILOSEC) 20 MG DR capsule  Dispense: 30 capsule; Refill: 1    Elevated BP without diagnosis of hypertension  - BP noted to be elevated today - no diagnosis of HTN in her past. Will recheck BP at 1 month follow up      Return in about 4 weeks (around 11/1/2021) for Follow up.    Alissa Scanlon MD  [facility] CHAITANYA Alvarado is a 56 year old who presents for the following health issues    HPI     Abdominal bloating x 9 months  - worsening over the last 4 month  - feels all the time  - when I eat the food, feels like not digesting food  - feeling bloated and hard  - feels than before because it feels like something is stuck in her stomach  - when she pushes her stomach it feels hard  - no abdominal pain; though she states prior to this starting she had lower abdominal pain  - after eating it worse where it feels heavy and hard  - nothing  makes it better  - a/w reflux - burning pain after eating  - a/w increased burping  - a/w back pain - points to low mid back; feels like tapping pain when sitting and getting up and walking  - occasional nausea when she eats a lot of food  - denies vomiting  - no problem pooping; BM everyday, no difficulty urinating  - last period was 14 years ago, no vaginal bleeding  - unexplained weight loss  - feel tired and weakness x 1 month; feels like she can't walk as far  - never had h pylori before    Review of Systems   Constitutional: Negative for unexpected weight change.   Eyes: Negative for visual disturbance.   Respiratory: Negative for shortness of breath.    Cardiovascular: Negative for chest pain.   Gastrointestinal: Positive for abdominal distention and heartburn. Negative for constipation, diarrhea, hematochezia, nausea and vomiting.   Endocrine: Negative for polydipsia and polyuria.   Genitourinary: Negative for difficulty urinating and vaginal bleeding.   Musculoskeletal: Positive for back pain.   Neurological: Negative for numbness and paresthesias.   Psychiatric/Behavioral: Negative for dysphoric mood.          Objective    BP (!) 154/80   Pulse 84   Temp 99.6  F (37.6  C) (Oral)   Resp 16   Wt 66.7 kg (147 lb)   SpO2 98%   BMI 26.04 kg/m    Body mass index is 26.04 kg/m .  Physical Exam  Constitutional:       Appearance: Normal appearance.   HENT:      Head: Normocephalic and atraumatic.   Eyes:      Conjunctiva/sclera: Conjunctivae normal.   Cardiovascular:      Rate and Rhythm: Normal rate and regular rhythm.      Heart sounds: Normal heart sounds. No murmur heard.     Pulmonary:      Effort: Pulmonary effort is normal. No respiratory distress.      Breath sounds: Normal breath sounds. No wheezing.   Abdominal:      General: Bowel sounds are normal.      Palpations: Abdomen is soft.      Tenderness: There is abdominal tenderness (LLQ, RLQ, suprapubic). There is no right CVA tenderness, left CVA  tenderness, guarding or rebound.   Musculoskeletal:      Lumbar back: Spasms and tenderness (lateral lumbar tenderness at the level of ~L2-L3) present.      Right lower leg: No edema.      Left lower leg: No edema.   Skin:     General: Skin is warm and dry.   Neurological:      Mental Status: She is alert. Mental status is at baseline.      Motor: No weakness.   Psychiatric:         Mood and Affect: Mood normal.         Behavior: Behavior normal.         Thought Content: Thought content normal.         Judgment: Judgment normal.

## 2021-10-04 NOTE — PATIENT INSTRUCTIONS
Patient Education   Here is the plan from today's visit    1. Abdominal bloating  Please collect a stool sample for the test for the bacteria and after collecting the stool sample, you can start the medication.   - Helicobacter pylori Antigen Stool; Future  - omeprazole (PRILOSEC) 20 MG DR capsule; Take 1 capsule (20 mg) by mouth daily  Dispense: 30 capsule; Refill: 1      Please call or return to clinic if your symptoms don't go away.    Follow up plan  Return in about 4 weeks (around 11/1/2021) for Follow up.    Thank you for coming to [facility] today.  COVID-19 Vaccine:  [facility] has walk-in appointments for COVID-19 vaccines. No appointment needed!   You also have the option of receiving Pfizer vaccine during your physician appointment. Please ask your care team for more information!  Lab Testing:  **If you had lab testing today and your results are reassuring or normal they will be mailed to you or sent through [employer] within 7 days.   **If the lab tests need quick action we will call you with the results.  **If you are having labs done on a different day, please call [phone] to schedule at [facility] or [phone] for other [facility] locations.   The phone number we will call with results is # [phone] (home) . If this is not the best number please call our clinic and change the number.  Medication Refills:  If you need any refills please call your pharmacy and they will contact us.   If you need to  your refill at a new pharmacy, please contact the new pharmacy directly. The new pharmacy will help you get your medications transferred faster.   Scheduling:  If you have any concerns about today's visit or wish to schedule another appointment please call our office during normal business hours [phone] (8-5:00 M-F)  If a referral was made to a [facility] and you don't get a call from central scheduling please call  [phone].  If a Mammogram was ordered for you at [facility] call [phone] to schedule or change your appointment.  If you had an EKG/XRay/CT/Ultrasound/MRI ordered the number is [phone] to schedule or change your radiology appointment.   Medical Concerns:  If you have urgent medical concerns please call [phone] at any time of the day.    Alissa Scanlon MD

## 2021-11-18 ENCOUNTER — HOSPITAL ENCOUNTER (EMERGENCY)
Facility: CLINIC | Age: 56
Discharge: HOME | End: 2021-11-19
Attending: EMERGENCY MEDICINE | Admitting: EMERGENCY MEDICINE
Payer: COMMERCIAL

## 2021-11-18 DIAGNOSIS — R14.0 ABDOMINAL BLOATING: ICD-10-CM

## 2021-11-18 DIAGNOSIS — R07.9 CHEST PAIN, UNSPECIFIED TYPE: ICD-10-CM

## 2021-11-18 LAB
ANION GAP SERPL CALCULATED.3IONS-SCNC: 5 MMOL/L (ref 3–14)
BASOPHILS # BLD AUTO: 0 10E3/UL (ref 0–0.2)
BASOPHILS NFR BLD AUTO: 1 %
BUN SERPL-MCNC: 13 MG/DL (ref 7–30)
CALCIUM SERPL-MCNC: 8.6 MG/DL (ref 8.5–10.1)
CHLORIDE BLD-SCNC: 107 MMOL/L (ref 94–109)
CO2 SERPL-SCNC: 28 MMOL/L (ref 20–32)
CREAT SERPL-MCNC: 0.65 MG/DL (ref 0.52–1.04)
EOSINOPHIL # BLD AUTO: 0.1 10E3/UL (ref 0–0.7)
EOSINOPHIL NFR BLD AUTO: 2 %
ERYTHROCYTE [DISTWIDTH] IN BLOOD BY AUTOMATED COUNT: 12.9 % (ref 10–15)
GFR SERPL CREATININE-BSD FRML MDRD: >90 ML/MIN/1.73M2
GLUCOSE BLD-MCNC: 124 MG/DL (ref 70–99)
HCT VFR BLD AUTO: 39.5 % (ref 35–47)
HGB BLD-MCNC: 12.9 G/DL (ref 11.7–15.7)
IMM GRANULOCYTES # BLD: 0 10E3/UL
IMM GRANULOCYTES NFR BLD: 0 %
LYMPHOCYTES # BLD AUTO: 2.3 10E3/UL (ref 0.8–5.3)
LYMPHOCYTES NFR BLD AUTO: 46 %
MCH RBC QN AUTO: 29.9 PG (ref 26.5–33)
MCHC RBC AUTO-ENTMCNC: 32.7 G/DL (ref 31.5–36.5)
MCV RBC AUTO: 91 FL (ref 78–100)
MONOCYTES # BLD AUTO: 0.6 10E3/UL (ref 0–1.3)
MONOCYTES NFR BLD AUTO: 12 %
NEUTROPHILS # BLD AUTO: 1.9 10E3/UL (ref 1.6–8.3)
NEUTROPHILS NFR BLD AUTO: 39 %
NRBC # BLD AUTO: 0 10E3/UL
NRBC BLD AUTO-RTO: 0 /100
PLATELET # BLD AUTO: 319 10E3/UL (ref 150–450)
POTASSIUM BLD-SCNC: 4.1 MMOL/L (ref 3.4–5.3)
RBC # BLD AUTO: 4.32 10E6/UL (ref 3.8–5.2)
SODIUM SERPL-SCNC: 140 MMOL/L (ref 133–144)
TROPONIN I SERPL-MCNC: <0.015 UG/L (ref 0–0.04)
WBC # BLD AUTO: 4.9 10E3/UL (ref 4–11)

## 2021-11-18 PROCEDURE — 93010 ELECTROCARDIOGRAM REPORT: CPT | Performed by: EMERGENCY MEDICINE

## 2021-11-18 PROCEDURE — 36415 COLL VENOUS BLD VENIPUNCTURE: CPT | Performed by: EMERGENCY MEDICINE

## 2021-11-18 PROCEDURE — 93005 ELECTROCARDIOGRAM TRACING: CPT | Performed by: EMERGENCY MEDICINE

## 2021-11-18 PROCEDURE — 250N000013 HC RX MED GY IP 250 OP 250 PS 637: Performed by: EMERGENCY MEDICINE

## 2021-11-18 PROCEDURE — 250N000009 HC RX 250: Performed by: EMERGENCY MEDICINE

## 2021-11-18 PROCEDURE — 80048 BASIC METABOLIC PNL TOTAL CA: CPT | Performed by: EMERGENCY MEDICINE

## 2021-11-18 PROCEDURE — 84484 ASSAY OF TROPONIN QUANT: CPT | Performed by: EMERGENCY MEDICINE

## 2021-11-18 PROCEDURE — 99285 EMERGENCY DEPT VISIT HI MDM: CPT | Mod: 25 | Performed by: EMERGENCY MEDICINE

## 2021-11-18 PROCEDURE — 85025 COMPLETE CBC W/AUTO DIFF WBC: CPT | Performed by: EMERGENCY MEDICINE

## 2021-11-18 PROCEDURE — 99284 EMERGENCY DEPT VISIT MOD MDM: CPT | Performed by: EMERGENCY MEDICINE

## 2021-11-18 RX ORDER — TRAZODONE HYDROCHLORIDE 150 MG/1
150 TABLET ORAL AT BEDTIME
COMMUNITY
End: 2022-01-07

## 2021-11-18 RX ORDER — SUCRALFATE ORAL 1 G/10ML
1 SUSPENSION ORAL 4 TIMES DAILY
Qty: 420 ML | Refills: 0 | Status: SHIPPED | OUTPATIENT
Start: 2021-11-18

## 2021-11-18 RX ORDER — ONDANSETRON 4 MG/1
TABLET, FILM COATED ORAL EVERY 8 HOURS PRN
COMMUNITY
End: 2022-01-07

## 2021-11-18 RX ADMIN — LIDOCAINE HYDROCHLORIDE 30 ML: 20 SOLUTION ORAL; TOPICAL at 23:11

## 2021-11-19 VITALS
TEMPERATURE: 98.7 F | WEIGHT: 149.8 LBS | OXYGEN SATURATION: 98 % | HEART RATE: 85 BPM | DIASTOLIC BLOOD PRESSURE: 86 MMHG | SYSTOLIC BLOOD PRESSURE: 142 MMHG | RESPIRATION RATE: 16 BRPM | BODY MASS INDEX: 26.54 KG/M2

## 2021-11-19 NOTE — ED PROVIDER NOTES
ED Provider Note  [facility]      History     Chief Complaint   Patient presents with     Chest Pain     last night; shoots through the back and shoulders      HPI  Jenny Stephen is a 56 year old female who presents with burning pain in the center of her chest that started last night.  The pain goes along the midline of her chest.  She states that the pain has been constant light since last night, is slightly better after taking omeprazole.  She has not noticed anything that makes the pain better or worse.  Patient denies any fever, cough, shortness of breath, lightheadedness.  The pain is associated with an achiness in her upper back.  Patient denies any history of heart disease, but does have a history of reflux.  She denies any nausea, vomiting, abdominal pain, dysuria.  Patient also states that the pain is worse with pressure on her chest or with pushing on her chest.  Patient also reports 3 out of 10 left ear pain for the last week, no nasal congestion, sore throat, runny nose.    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 No pertinent surgical history.  Acetaminophen (APAP) 500 MG TABS  omeprazole (PRILOSEC) 20 MG DR capsule  ondansetron (ZOFRAN) 4 MG tablet  sucralfate (CARAFATE) 1 GM/10ML suspension  traZODone (DESYREL) 150 MG tablet  DULoxetine (CYMBALTA) 30 MG capsule  FLUoxetine (PROZAC) 20 MG/5ML solution  guaiFENesin-codeine (ROBITUSSIN AC) 100-10 MG/5ML SOLN solution  HYDROcodone-acetaminophen (NORCO) 5-325 MG per tablet  ibuprofen (ADVIL,MOTRIN) 600 MG tablet      Allergies   Allergen Reactions     Chicken Allergy      Date Seed Extract [Zizyphus Jujuba] Swelling     Allergy to Date fruit. Swelling in eyes.      Eggs [Chicken-Derived  Products (Egg)] Rash     Family History  Family History   Problem Relation Age of Onset     Glaucoma No family hx of      Macular Degeneration No family hx of      Social History   Social History     Tobacco Use     Smoking status: Never Smoker     Smokeless tobacco: Never Used   Substance Use Topics     Alcohol use: No     Drug use: No      Past medical history, past surgical history, medications, allergies, family history, and social history were reviewed with the patient. No additional pertinent items.       Review of Systems  A complete review of systems was performed with pertinent positives and negatives noted in the HPI, and all other systems negative.    Physical Exam   BP: 136/85  Pulse: 78  Temp: 98.7  F (37.1  C)  Resp: 18  Weight: 67.9 kg (149 lb 12.8 oz)  SpO2: 99 %  Physical Exam  GEN:  Alert, well developed, no acute distress  HEENT:  PERRL, EOMI, Mucous membranes are moist.  I am unable to see the left TM due to cerumen.  Cardio:  RRR, no murmur, radial pulses equal bilaterally  PULM:  Lungs clear, good air movement, no wheezes, rales   Abd:  Soft, normal bowel sounds, no focal tenderness  Back exam:  No CVA tenderness  Musculoskeletal: There is chest wall tenderness to palpation without any crepitance or ecchymosis, extremities have normal range of motion, no lower extremity swelling or calf tenderness  Neuro:  Alert and oriented X3, Follows commands, moving all extremities spontaneously   Skin:  Warm, dry   ED Course      Procedures            EKG Interpretation:      Interpreted by Padmini Johnson MD  Time reviewed: 21:50  Symptoms at time of EKG: chest pain   Rhythm: normal sinus   Rate: normal  Axis: normal  Ectopy: none  Conduction: normal  ST Segments/ T Waves: No ST-T wave changes  Q Waves: none  Comparison to prior: Unchanged from 10/25/2017    Clinical Impression: normal EKG      Referring labs were reviewed by me and results are shown below.  Patient was given a GI cocktail and  had significant relief of her symptoms.  She states she has been taking omeprazole, but does not feel that it helps much.  She reports that that was advised by her primary care provider.       Results for orders placed or performed during the hospital encounter of 11/18/21   Basic metabolic panel     Status: Abnormal   Result Value Ref Range    Sodium 140 133 - 144 mmol/L    Potassium 4.1 3.4 - 5.3 mmol/L    Chloride 107 94 - 109 mmol/L    Carbon Dioxide (CO2) 28 20 - 32 mmol/L    Anion Gap 5 3 - 14 mmol/L    Urea Nitrogen 13 7 - 30 mg/dL    Creatinine 0.65 0.52 - 1.04 mg/dL    Calcium 8.6 8.5 - 10.1 mg/dL    Glucose 124 (H) 70 - 99 mg/dL    GFR Estimate >90 >60 mL/min/1.73m2   CBC with platelets and differential     Status: None   Result Value Ref Range    WBC Count 4.9 4.0 - 11.0 10e3/uL    RBC Count 4.32 3.80 - 5.20 10e6/uL    Hemoglobin 12.9 11.7 - 15.7 g/dL    Hematocrit 39.5 35.0 - 47.0 %    MCV 91 78 - 100 fL    MCH 29.9 26.5 - 33.0 pg    MCHC 32.7 31.5 - 36.5 g/dL    RDW 12.9 10.0 - 15.0 %    Platelet Count 319 150 - 450 10e3/uL    % Neutrophils 39 %    % Lymphocytes 46 %    % Monocytes 12 %    % Eosinophils 2 %    % Basophils 1 %    % Immature Granulocytes 0 %    NRBCs per 100 WBC 0 <1 /100    Absolute Neutrophils 1.9 1.6 - 8.3 10e3/uL    Absolute Lymphocytes 2.3 0.8 - 5.3 10e3/uL    Absolute Monocytes 0.6 0.0 - 1.3 10e3/uL    Absolute Eosinophils 0.1 0.0 - 0.7 10e3/uL    Absolute Basophils 0.0 0.0 - 0.2 10e3/uL    Absolute Immature Granulocytes 0.0 <=0.0 10e3/uL    Absolute NRBCs 0.0 10e3/uL   Troponin I     Status: Normal   Result Value Ref Range    Troponin I <0.015 0.000 - 0.045 ug/L   CBC with Platelets & Differential     Status: None    Narrative    The following orders were created for panel order CBC with Platelets & Differential.  Procedure                               Abnormality         Status                     ---------                               -----------         ------                      CBC with platelets and d...[[id number]]                      Final result                 Please view results for these tests on the individual orders.     Medications   lidocaine (XYLOCAINE) 2 % 15 mL, alum & mag hydroxide-simethicone (MAALOX) 15 mL GI Cocktail (30 mLs Oral Given 11/18/21 9233)        Assessments & Plan (with Medical Decision Making)   Patient presents with constant chest pain since yesterday, no shortness of breath, lightheadedness, palpitations, has a normal EKG and negative troponin, this is unlikely to be ACS.  Her pain is not pleuritic, doubt PE, normal blood pressure, pulses, doubt aortic dissection.  Symptoms are most suggestive of gastritis versus GERD.  She has significant relief after GI cocktail, so I prescribed Carafate and advised that she increase her omeprazole dose to 40 mg a day.  Patient was advised to follow-up with her primary care physician as well.  She was encouraged to return the emergency department if she notes worsening pain, shortness of breath, lightheadedness, fainting, any other concerns.  Patient was advised to use Debrox to clean out the left ear and then follow-up with her primary care provider to recheck the ear if she continues to have left ear pain.    I have reviewed the nursing notes. I have reviewed the findings, diagnosis, plan and need for follow up with the patient.    New Prescriptions    SUCRALFATE (CARAFATE) 1 GM/10ML SUSPENSION    Take 10 mLs (1 g) by mouth 4 times daily       Final diagnoses:   Chest pain, unspecified type       --  Padmini Johnson  [facility] EMERGENCY DEPARTMENT  11/18/2021     Padmini Johnson MD  11/18/21 8456

## 2021-11-19 NOTE — DISCHARGE INSTRUCTIONS
Please make an appointment to follow up with Your Primary Care Provider in 5-7 days if not improving or if other concerns.    Please return the emergency department if you have worsening pain, lightheadedness, shortness of breath, any fainting spells, any other concerns.

## 2021-11-21 LAB
ATRIAL RATE - MUSE: 84 BPM
DIASTOLIC BLOOD PRESSURE - MUSE: NORMAL MMHG
INTERPRETATION ECG - MUSE: NORMAL
P AXIS - MUSE: 64 DEGREES
PR INTERVAL - MUSE: 156 MS
QRS DURATION - MUSE: 68 MS
QT - MUSE: 370 MS
QTC - MUSE: 437 MS
R AXIS - MUSE: -5 DEGREES
SYSTOLIC BLOOD PRESSURE - MUSE: NORMAL MMHG
T AXIS - MUSE: 10 DEGREES
VENTRICULAR RATE- MUSE: 84 BPM

## 2022-01-07 ENCOUNTER — OFFICE VISIT (OUTPATIENT)
Dept: FAMILY MEDICINE | Facility: CLINIC | Age: 57
End: 2022-01-07
Payer: COMMERCIAL

## 2022-01-07 VITALS
SYSTOLIC BLOOD PRESSURE: 131 MMHG | DIASTOLIC BLOOD PRESSURE: 75 MMHG | WEIGHT: 152 LBS | RESPIRATION RATE: 16 BRPM | HEART RATE: 77 BPM | BODY MASS INDEX: 26.93 KG/M2 | OXYGEN SATURATION: 98 % | TEMPERATURE: 98.8 F

## 2022-01-07 DIAGNOSIS — J30.2 SEASONAL ALLERGIC RHINITIS, UNSPECIFIED TRIGGER: ICD-10-CM

## 2022-01-07 DIAGNOSIS — R73.03 PREDIABETES: ICD-10-CM

## 2022-01-07 DIAGNOSIS — R14.0 ABDOMINAL BLOATING: ICD-10-CM

## 2022-01-07 DIAGNOSIS — Z00.00 ROUTINE GENERAL MEDICAL EXAMINATION AT A HEALTH CARE FACILITY: Primary | ICD-10-CM

## 2022-01-07 DIAGNOSIS — Z13.220 SCREENING FOR HYPERLIPIDEMIA: ICD-10-CM

## 2022-01-07 DIAGNOSIS — F32.9 MAJOR DEPRESSIVE DISORDER, REMISSION STATUS UNSPECIFIED, UNSPECIFIED WHETHER RECURRENT: ICD-10-CM

## 2022-01-07 LAB
CHOLEST SERPL-MCNC: 239 MG/DL
FASTING STATUS PATIENT QL REPORTED: NO
HBA1C MFR BLD: 6.9 % (ref 0–5.6)
HDLC SERPL-MCNC: 59 MG/DL
LDLC SERPL CALC-MCNC: 153 MG/DL
NONHDLC SERPL-MCNC: 180 MG/DL
TRIGL SERPL-MCNC: 136 MG/DL

## 2022-01-07 PROCEDURE — 80061 LIPID PANEL: CPT | Performed by: STUDENT IN AN ORGANIZED HEALTH CARE EDUCATION/TRAINING PROGRAM

## 2022-01-07 PROCEDURE — 83036 HEMOGLOBIN GLYCOSYLATED A1C: CPT | Performed by: STUDENT IN AN ORGANIZED HEALTH CARE EDUCATION/TRAINING PROGRAM

## 2022-01-07 PROCEDURE — 99396 PREV VISIT EST AGE 40-64: CPT | Mod: GC | Performed by: STUDENT IN AN ORGANIZED HEALTH CARE EDUCATION/TRAINING PROGRAM

## 2022-01-07 PROCEDURE — 36415 COLL VENOUS BLD VENIPUNCTURE: CPT | Performed by: STUDENT IN AN ORGANIZED HEALTH CARE EDUCATION/TRAINING PROGRAM

## 2022-01-07 RX ORDER — METFORMIN HCL 500 MG
500 TABLET, EXTENDED RELEASE 24 HR ORAL
Qty: 90 TABLET | Refills: 0 | Status: SHIPPED | OUTPATIENT
Start: 2022-01-07

## 2022-01-07 RX ORDER — CETIRIZINE HYDROCHLORIDE 10 MG/1
10 TABLET ORAL DAILY PRN
COMMUNITY
Start: 2020-08-12 | End: 2022-01-07

## 2022-01-07 RX ORDER — TRAZODONE HYDROCHLORIDE 150 MG/1
150 TABLET ORAL AT BEDTIME
Qty: 30 TABLET | Refills: 0 | Status: SHIPPED | OUTPATIENT
Start: 2022-01-07

## 2022-01-07 RX ORDER — CETIRIZINE HYDROCHLORIDE 10 MG/1
10 TABLET ORAL DAILY PRN
Qty: 60 TABLET | Refills: 0 | Status: SHIPPED | OUTPATIENT
Start: 2022-01-07

## 2022-01-07 RX ORDER — ACETAMINOPHEN 500 MG
500-1000 TABLET ORAL 2 TIMES DAILY PRN
Qty: 90 TABLET | Refills: 0 | Status: SHIPPED | OUTPATIENT
Start: 2022-01-07

## 2022-01-07 NOTE — PROGRESS NOTES
SUBJECTIVE:   CC: Jenny Stephen is an 57 year old woman who presents for preventive health visit.     Patient has been advised of split billing requirements and indicates understanding: Yes  HPI    Today's PHQ-2 Score:   PHQ-2 (  [employer]) 2022   Q1: Little interest or pleasure in doing things 0   Q2: Feeling down, depressed or hopeless 0   PHQ-2 Score 0   PHQ-2 Total Score (12-17 Years)- Positive if 3 or more points; Administer PHQ-A if positive -   Q1: Little interest or pleasure in doing things -   Q2: Feeling down, depressed or hopeless -   PHQ-2 Score -     Abuse: Current or Past (Physical, Sexual or Emotional) - No  Do you feel safe in your environment? Yes    Have you ever done Advance Care Planning? (For example, a Health Directive, POLST, or a discussion with a medical provider or your loved ones about your wishes): No, advance care planning information given to patient to review.  Patient plans to discuss their wishes with loved ones or provider.      Social History     Tobacco Use     Smoking status: Never Smoker     Smokeless tobacco: Never Used   Substance Use Topics     Alcohol use: No   No alcohol or drug use      No flowsheet data found.    Reviewed orders with patient.  Reviewed health maintenance and updated orders accordingly - Yes  Lab work is in process  Last a1c 6.7    Breast Cancer Screening:  Any new diagnosis of family breast, ovarian, or bowel cancer? No    Mammogram Screening: Recommended mammography every 1-2 years with patient discussion and risk factor consideration  Pertinent mammograms are reviewed under the imaging tab.    History of abnormal Pap smear: No, is due for pap and is not interested.      Reviewed and updated as needed this visit by clinical staff  Tobacco  Allergies  Meds   Med Hx  Surg Hx  Fam Hx  Soc Hx     Reviewed and updated as needed this visit by Provider    Meds           Review of Systems  Denies chest pain, shortness of breath,  headache, nausea, vomiting.      OBJECTIVE:   /75   Pulse 77   Temp 98.8  F (37.1  C) (Oral)   Resp 16   Wt 68.9 kg (152 lb)   SpO2 98%   BMI 26.93 kg/m    Physical Exam  General: Alert and oriented, in no acute distress.  Skin: Warm and dry, no abnormalities noted.  Eyes: Extra-ocular muscles intact, pupils equal and reactive.  ENT: Speech intact, nasal passages open, no hearing impairment noted.  CV: RRR, no murmurs heard, No cyanosis or pallor, warm and well perfused.  Respiratory: CAB, no wheezing, rhonchi or rales  Neuro: Gait and station normal, comprehension intact. Gross and fine motor skills intact.   Psychiatric: Mood and affect appear normal.   Extremities: Warm, able to move all four extremities at will.      ASSESSMENT/PLAN:   Jenny was seen today for physical.    Routine general medical examination at a health care facility  For tomorrow is leaving for [facility] for 3 months and presents for an annual as well as medication refills.  Is due for mammogram and colonoscopy, which she states she will schedule after she gets back from her trip.  Declines Pap today, despite discussing risks of cervical cancer.  Declines screening HIV or hepatitis C testing today.  -     MA Screening Digital Bilateral  -     acetaminophen (TYLENOL) 500 MG tablet; Take 1-2 tablets (500-1,000 mg) by mouth 2 times daily as needed for mild pain  -     Adult Gastro Ref - Procedure Only; Future  -     Hemoglobin A1c    Prediabetes  Last A1C 6.7 in March 2021. She denies ever having been told she has prediabetes and wants to know if she can have a machine to check her blood sugars.  Discussed diagnosis of diabetes versus prediabetes and recommendation to start Metformin.  Reviewed risks and benefits and common side effects, patient understanding.  We will recheck A1c today and annually from now on  -     Hemoglobin A1c  -     metFORMIN (GLUCOPHAGE-XR) 500 MG 24 hr tablet; Take 1 tablet (500 mg) by mouth daily (with  "dinner)    Abdominal bloating  Refill  -     omeprazole (PRILOSEC) 20 MG DR capsule; Take 1 capsule (20 mg) by mouth daily    Major depressive disorder, remission status unspecified, unspecified whether recurrent  PHQ-9 =0.  Refill  -     traZODone (DESYREL) 150 MG tablet; Take 1 tablet (150 mg) by mouth At Bedtime    Seasonal allergic rhinitis, unspecified trigger  Refill  -     cetirizine (ZYRTEC) 10 MG tablet; Take 1 tablet (10 mg) by mouth daily as needed for allergies    Screening for hyperlipidemia  History of high cholesterol with no statin initiated.  Patient denies she has ever been told she has high cholesterol.  We will recheck lipids today and consider starting statin if indicated  -     Lipid panel reflex to direct LDL Non-fasting; Future      Patient has been advised of split billing requirements and indicates understanding: Yes  COUNSELING:  Reviewed preventive health counseling, as reflected in patient instructions       Regular exercise       Healthy diet/nutrition       Colon cancer screening       Consider Hep C screening for all patients one time for ages 18-79 years       HIV screeninx in teen years, 1x in adult years, and at intervals if high risk       Cervical cancer screening    Estimated body mass index is 26.93 kg/m  as calculated from the following:    Height as of 10/25/17: 1.6 m (5' 3\").    Weight as of this encounter: 68.9 kg (152 lb).      She reports that she has never smoked. She has never used smokeless tobacco.      Counseling Resources:  ATP IV Guidelines  Pooled Cohorts Equation Calculator  Breast Cancer Risk Calculator  BRCA-Related Cancer Risk Assessment: FHS-7 Tool  FRAX Risk Assessment  ICSI Preventive Guidelines  Dietary Guidelines for Americans, 2010  USDA's MyPlate  ASA Prophylaxis  Lung CA Screening    Tanisha Easley DO  [facility]  "

## 2022-01-07 NOTE — LETTER
January 13, 2022      Jenny Cathy Zafar  [address]   [place] [address]    Dear ,    Your test results fall within the expected range(s) or remain unchanged from previous results.    We started you on Metformin at your last appointment and based on these labs, you should continue taking that every day. This will help you stay in Pre-Diabetes instead of developing Diabetes.     Resulted Orders   Lipid panel reflex to direct LDL Non-fasting   Result Value Ref Range    Cholesterol 239 (H) <200 mg/dL    Triglycerides 136 <150 mg/dL    Direct Measure HDL 59 >=50 mg/dL    LDL Cholesterol Calculated 153 (H) <=100 mg/dL    Non HDL Cholesterol 180 (H) <130 mg/dL    Patient Fasting > 8hrs? No     Narrative    Cholesterol  Desirable:  <200 mg/dL    Triglycerides  Normal:  Less than 150 mg/dL  Borderline High:  150-199 mg/dL  High:  200-499 mg/dL  Very High:  Greater than or equal to 500 mg/dL    Direct Measure HDL  Female:  Greater than or equal to 50 mg/dL   Male:  Greater than or equal to 40 mg/dL    LDL Cholesterol  Desirable:  <100mg/dL  Above Desirable:  100-129 mg/dL   Borderline High:  130-159 mg/dL   High:  160-189 mg/dL   Very High:  >= 190 mg/dL    Non HDL Cholesterol  Desirable:  130 mg/dL  Above Desirable:  130-159 mg/dL  Borderline High:  160-189 mg/dL  High:  190-219 mg/dL  Very High:  Greater than or equal to 220 mg/dL   Hemoglobin A1c   Result Value Ref Range    Hemoglobin A1C 6.9 (H) 0.0 - 5.6 %      Comment:      Normal <5.7%   Prediabetes 5.7-6.4%    Diabetes 6.5% or higher     Note: Adopted from ADA consensus guidelines.       If you have any questions or concerns, please call the clinic at the number listed above.       Sincerely,      Tanisha Easley, DO   she/her  PGY-2  Family Medicine

## 2022-01-07 NOTE — PROGRESS NOTES
Preceptor Attestation:   Patient seen, evaluated and discussed with the resident. I have verified the content of the note, which accurately reflects my assessment of the patient and the plan of care.   Supervising Physician:  Dora Howell, DO

## 2022-01-13 PROBLEM — R73.03 PREDIABETES: Status: ACTIVE | Noted: 2022-01-13

## 2023-10-23 ENCOUNTER — LAB REQUISITION (OUTPATIENT)
Dept: LAB | Facility: CLINIC | Age: 58
End: 2023-10-23
Payer: COMMERCIAL

## 2023-10-23 DIAGNOSIS — R52 PAIN, UNSPECIFIED: ICD-10-CM

## 2023-10-23 DIAGNOSIS — Z13.220 ENCOUNTER FOR SCREENING FOR LIPOID DISORDERS: ICD-10-CM

## 2023-10-23 PROCEDURE — 86140 C-REACTIVE PROTEIN: CPT | Mod: ORL | Performed by: INTERNAL MEDICINE

## 2023-10-23 PROCEDURE — 86200 CCP ANTIBODY: CPT | Mod: ORL | Performed by: INTERNAL MEDICINE

## 2023-10-23 PROCEDURE — 84443 ASSAY THYROID STIM HORMONE: CPT | Mod: ORL | Performed by: INTERNAL MEDICINE

## 2023-10-23 PROCEDURE — 82550 ASSAY OF CK (CPK): CPT | Mod: ORL | Performed by: INTERNAL MEDICINE

## 2023-10-23 PROCEDURE — 82306 VITAMIN D 25 HYDROXY: CPT | Mod: ORL | Performed by: INTERNAL MEDICINE

## 2023-10-23 PROCEDURE — 80053 COMPREHEN METABOLIC PANEL: CPT | Mod: ORL | Performed by: INTERNAL MEDICINE

## 2023-10-23 PROCEDURE — 80061 LIPID PANEL: CPT | Mod: ORL | Performed by: INTERNAL MEDICINE

## 2023-10-23 PROCEDURE — 82607 VITAMIN B-12: CPT | Mod: ORL | Performed by: INTERNAL MEDICINE

## 2023-10-23 PROCEDURE — 86431 RHEUMATOID FACTOR QUANT: CPT | Mod: ORL | Performed by: INTERNAL MEDICINE

## 2023-10-24 LAB
ALBUMIN SERPL BCG-MCNC: 4.6 G/DL (ref 3.5–5.2)
ALP SERPL-CCNC: 101 U/L (ref 35–104)
ALT SERPL W P-5'-P-CCNC: 17 U/L (ref 0–50)
ANION GAP SERPL CALCULATED.3IONS-SCNC: 13 MMOL/L (ref 7–15)
AST SERPL W P-5'-P-CCNC: 18 U/L (ref 0–45)
BILIRUB SERPL-MCNC: 0.2 MG/DL
BUN SERPL-MCNC: 10.7 MG/DL (ref 6–20)
CALCIUM SERPL-MCNC: 9.6 MG/DL (ref 8.6–10)
CHLORIDE SERPL-SCNC: 103 MMOL/L (ref 98–107)
CHOLEST SERPL-MCNC: 255 MG/DL
CK SERPL-CCNC: 88 U/L (ref 26–192)
CREAT SERPL-MCNC: 0.64 MG/DL (ref 0.51–0.95)
CRP SERPL-MCNC: <3 MG/L
DEPRECATED HCO3 PLAS-SCNC: 23 MMOL/L (ref 22–29)
EGFRCR SERPLBLD CKD-EPI 2021: >90 ML/MIN/1.73M2
GLUCOSE SERPL-MCNC: 121 MG/DL (ref 70–99)
HDLC SERPL-MCNC: 58 MG/DL
LDLC SERPL CALC-MCNC: 172 MG/DL
NONHDLC SERPL-MCNC: 197 MG/DL
POTASSIUM SERPL-SCNC: 4.4 MMOL/L (ref 3.4–5.3)
PROT SERPL-MCNC: 7.4 G/DL (ref 6.4–8.3)
RHEUMATOID FACT SER NEPH-ACNC: <6 IU/ML
SODIUM SERPL-SCNC: 139 MMOL/L (ref 135–145)
TRIGL SERPL-MCNC: 127 MG/DL
TSH SERPL DL<=0.005 MIU/L-ACNC: 1.19 UIU/ML (ref 0.3–4.2)
VIT B12 SERPL-MCNC: 463 PG/ML (ref 232–1245)
VIT D+METAB SERPL-MCNC: 24 NG/ML (ref 20–50)

## 2023-10-26 LAB — CCP AB SER IA-ACNC: 0.8 U/ML

## 2023-11-14 ENCOUNTER — TRANSCRIBE ORDERS (OUTPATIENT)
Dept: OTHER | Age: 58
End: 2023-11-14

## 2023-11-14 DIAGNOSIS — M13.0 POLYARTICULAR ARTHRITIS: Primary | ICD-10-CM

## 2023-11-29 ENCOUNTER — APPOINTMENT (OUTPATIENT)
Dept: INTERPRETER SERVICES | Facility: CLINIC | Age: 58
End: 2023-11-29
Payer: COMMERCIAL

## 2024-06-17 PROBLEM — Z76.89 HEALTH CARE HOME: Status: RESOLVED | Noted: 2024-06-17 | Resolved: 2024-06-17
